# Patient Record
Sex: MALE | Race: WHITE | NOT HISPANIC OR LATINO | URBAN - METROPOLITAN AREA
[De-identification: names, ages, dates, MRNs, and addresses within clinical notes are randomized per-mention and may not be internally consistent; named-entity substitution may affect disease eponyms.]

---

## 2017-02-15 ENCOUNTER — COMMUNICATION - HEALTHEAST (OUTPATIENT)
Dept: INTERNAL MEDICINE | Facility: CLINIC | Age: 67
End: 2017-02-15

## 2017-02-15 DIAGNOSIS — E03.9 HYPOTHYROIDISM: ICD-10-CM

## 2017-03-02 ENCOUNTER — COMMUNICATION - HEALTHEAST (OUTPATIENT)
Dept: INTERNAL MEDICINE | Facility: CLINIC | Age: 67
End: 2017-03-02

## 2017-03-02 DIAGNOSIS — E78.5 HYPERLIPIDEMIA: ICD-10-CM

## 2017-08-19 ENCOUNTER — COMMUNICATION - HEALTHEAST (OUTPATIENT)
Dept: INTERNAL MEDICINE | Facility: CLINIC | Age: 67
End: 2017-08-19

## 2017-08-19 DIAGNOSIS — E03.9 HYPOTHYROIDISM: ICD-10-CM

## 2017-11-08 ENCOUNTER — COMMUNICATION - HEALTHEAST (OUTPATIENT)
Dept: INTERNAL MEDICINE | Facility: CLINIC | Age: 67
End: 2017-11-08

## 2017-11-08 DIAGNOSIS — E78.5 HYPERLIPIDEMIA: ICD-10-CM

## 2017-11-24 ENCOUNTER — COMMUNICATION - HEALTHEAST (OUTPATIENT)
Dept: INTERNAL MEDICINE | Facility: CLINIC | Age: 67
End: 2017-11-24

## 2017-11-24 DIAGNOSIS — E03.9 HYPOTHYROIDISM: ICD-10-CM

## 2018-02-26 ENCOUNTER — COMMUNICATION - HEALTHEAST (OUTPATIENT)
Dept: INTERNAL MEDICINE | Facility: CLINIC | Age: 68
End: 2018-02-26

## 2018-02-26 DIAGNOSIS — E03.9 HYPOTHYROIDISM: ICD-10-CM

## 2018-05-22 ENCOUNTER — COMMUNICATION - HEALTHEAST (OUTPATIENT)
Dept: INTERNAL MEDICINE | Facility: CLINIC | Age: 68
End: 2018-05-22

## 2018-05-22 DIAGNOSIS — E03.9 HYPOTHYROIDISM: ICD-10-CM

## 2018-08-24 ENCOUNTER — COMMUNICATION - HEALTHEAST (OUTPATIENT)
Dept: INTERNAL MEDICINE | Facility: CLINIC | Age: 68
End: 2018-08-24

## 2018-08-24 DIAGNOSIS — E03.9 HYPOTHYROIDISM: ICD-10-CM

## 2018-11-09 ENCOUNTER — COMMUNICATION - HEALTHEAST (OUTPATIENT)
Dept: INTERNAL MEDICINE | Facility: CLINIC | Age: 68
End: 2018-11-09

## 2018-11-09 DIAGNOSIS — E78.5 HYPERLIPIDEMIA: ICD-10-CM

## 2018-12-02 ENCOUNTER — COMMUNICATION - HEALTHEAST (OUTPATIENT)
Dept: INTERNAL MEDICINE | Facility: CLINIC | Age: 68
End: 2018-12-02

## 2018-12-02 DIAGNOSIS — E03.9 HYPOTHYROIDISM: ICD-10-CM

## 2019-06-11 ENCOUNTER — RECORDS - HEALTHEAST (OUTPATIENT)
Dept: ADMINISTRATIVE | Facility: OTHER | Age: 69
End: 2019-06-11

## 2019-08-22 ENCOUNTER — COMMUNICATION - HEALTHEAST (OUTPATIENT)
Dept: INTERNAL MEDICINE | Facility: CLINIC | Age: 69
End: 2019-08-22

## 2019-08-22 DIAGNOSIS — E78.5 HYPERLIPIDEMIA: ICD-10-CM

## 2019-12-17 ENCOUNTER — COMMUNICATION - HEALTHEAST (OUTPATIENT)
Dept: INTERNAL MEDICINE | Facility: CLINIC | Age: 69
End: 2019-12-17

## 2019-12-17 DIAGNOSIS — E03.9 HYPOTHYROIDISM: ICD-10-CM

## 2020-03-30 ENCOUNTER — OFFICE VISIT - HEALTHEAST (OUTPATIENT)
Dept: INTERNAL MEDICINE | Facility: CLINIC | Age: 70
End: 2020-03-30

## 2020-03-30 ENCOUNTER — COMMUNICATION - HEALTHEAST (OUTPATIENT)
Dept: INTERNAL MEDICINE | Facility: CLINIC | Age: 70
End: 2020-03-30

## 2020-03-30 DIAGNOSIS — E78.5 HYPERLIPIDEMIA: ICD-10-CM

## 2020-03-30 DIAGNOSIS — E03.9 ACQUIRED HYPOTHYROIDISM: ICD-10-CM

## 2020-03-30 DIAGNOSIS — Z12.5 PROSTATE CANCER SCREENING: ICD-10-CM

## 2020-06-30 ENCOUNTER — COMMUNICATION - HEALTHEAST (OUTPATIENT)
Dept: INTERNAL MEDICINE | Facility: CLINIC | Age: 70
End: 2020-06-30

## 2020-06-30 DIAGNOSIS — E03.9 HYPOTHYROIDISM: ICD-10-CM

## 2020-07-07 ENCOUNTER — COMMUNICATION - HEALTHEAST (OUTPATIENT)
Dept: INTERNAL MEDICINE | Facility: CLINIC | Age: 70
End: 2020-07-07

## 2020-07-07 DIAGNOSIS — E03.9 HYPOTHYROIDISM: ICD-10-CM

## 2020-07-08 ENCOUNTER — COMMUNICATION - HEALTHEAST (OUTPATIENT)
Dept: INTERNAL MEDICINE | Facility: CLINIC | Age: 70
End: 2020-07-08

## 2020-07-08 DIAGNOSIS — E03.9 HYPOTHYROIDISM: ICD-10-CM

## 2020-12-27 ENCOUNTER — COMMUNICATION - HEALTHEAST (OUTPATIENT)
Dept: INTERNAL MEDICINE | Facility: CLINIC | Age: 70
End: 2020-12-27

## 2020-12-27 DIAGNOSIS — E78.5 HYPERLIPIDEMIA: ICD-10-CM

## 2021-01-03 ENCOUNTER — COMMUNICATION - HEALTHEAST (OUTPATIENT)
Dept: INTERNAL MEDICINE | Facility: CLINIC | Age: 71
End: 2021-01-03

## 2021-01-03 DIAGNOSIS — E03.9 HYPOTHYROIDISM: ICD-10-CM

## 2021-01-03 DIAGNOSIS — E78.5 HYPERLIPIDEMIA: ICD-10-CM

## 2021-01-13 ENCOUNTER — OFFICE VISIT - HEALTHEAST (OUTPATIENT)
Dept: INTERNAL MEDICINE | Facility: CLINIC | Age: 71
End: 2021-01-13

## 2021-01-13 ENCOUNTER — AMBULATORY - HEALTHEAST (OUTPATIENT)
Dept: LAB | Facility: CLINIC | Age: 71
End: 2021-01-13

## 2021-01-13 DIAGNOSIS — D35.2 BENIGN NEOPLASM OF PITUITARY GLAND AND CRANIOPHARYNGEAL DUCT (POUCH) (H): ICD-10-CM

## 2021-01-13 DIAGNOSIS — E03.9 ACQUIRED HYPOTHYROIDISM: ICD-10-CM

## 2021-01-13 DIAGNOSIS — Z12.5 PROSTATE CANCER SCREENING: ICD-10-CM

## 2021-01-13 DIAGNOSIS — E78.5 HYPERLIPIDEMIA: ICD-10-CM

## 2021-01-13 DIAGNOSIS — D35.3 BENIGN NEOPLASM OF PITUITARY GLAND AND CRANIOPHARYNGEAL DUCT (POUCH) (H): ICD-10-CM

## 2021-01-13 LAB
ALBUMIN SERPL-MCNC: 3.6 G/DL (ref 3.5–5)
ALP SERPL-CCNC: 100 U/L (ref 45–120)
ALT SERPL W P-5'-P-CCNC: 31 U/L (ref 0–45)
ANION GAP SERPL CALCULATED.3IONS-SCNC: 10 MMOL/L (ref 5–18)
AST SERPL W P-5'-P-CCNC: 24 U/L (ref 0–40)
BILIRUB DIRECT SERPL-MCNC: 0.3 MG/DL
BILIRUB SERPL-MCNC: 0.8 MG/DL (ref 0–1)
BUN SERPL-MCNC: 17 MG/DL (ref 8–28)
CALCIUM SERPL-MCNC: 8.9 MG/DL (ref 8.5–10.5)
CHLORIDE BLD-SCNC: 106 MMOL/L (ref 98–107)
CHOLEST SERPL-MCNC: 167 MG/DL
CO2 SERPL-SCNC: 24 MMOL/L (ref 22–31)
CORTIS SERPL-MCNC: 8.9 UG/DL
CREAT SERPL-MCNC: 0.95 MG/DL (ref 0.7–1.3)
FASTING STATUS PATIENT QL REPORTED: YES
GFR SERPL CREATININE-BSD FRML MDRD: >60 ML/MIN/1.73M2
GLUCOSE BLD-MCNC: 87 MG/DL (ref 70–125)
HDLC SERPL-MCNC: 37 MG/DL
LDLC SERPL CALC-MCNC: 118 MG/DL
POTASSIUM BLD-SCNC: 4.1 MMOL/L (ref 3.5–5)
PROT SERPL-MCNC: 6.5 G/DL (ref 6–8)
PSA SERPL-MCNC: 6.2 NG/ML (ref 0–6.5)
SODIUM SERPL-SCNC: 140 MMOL/L (ref 136–145)
T4 FREE SERPL-MCNC: 0.8 NG/DL (ref 0.7–1.8)
TRIGL SERPL-MCNC: 60 MG/DL
TSH SERPL DL<=0.005 MIU/L-ACNC: 2.15 UIU/ML (ref 0.3–5)

## 2021-01-13 RX ORDER — LORAZEPAM 0.5 MG/1
TABLET ORAL
Qty: 1 TABLET | Refills: 0 | Status: SHIPPED | OUTPATIENT
Start: 2021-01-13 | End: 2021-08-06

## 2021-01-15 ENCOUNTER — COMMUNICATION - HEALTHEAST (OUTPATIENT)
Dept: INTERNAL MEDICINE | Facility: CLINIC | Age: 71
End: 2021-01-15

## 2021-01-15 LAB
SHBG SERPL-SCNC: 22 NMOL/L (ref 11–80)
TESTOST FREE SERPL-MCNC: 3.92 NG/DL (ref 4.7–24.4)
TESTOST SERPL-MCNC: 168 NG/DL (ref 240–950)

## 2021-02-02 ENCOUNTER — COMMUNICATION - HEALTHEAST (OUTPATIENT)
Dept: INTERNAL MEDICINE | Facility: CLINIC | Age: 71
End: 2021-02-02

## 2021-02-10 ENCOUNTER — HOSPITAL ENCOUNTER (OUTPATIENT)
Dept: MRI IMAGING | Facility: HOSPITAL | Age: 71
Discharge: HOME | End: 2021-02-10
Attending: INTERNAL MEDICINE

## 2021-02-10 DIAGNOSIS — D35.2 BENIGN NEOPLASM OF PITUITARY GLAND AND CRANIOPHARYNGEAL DUCT (POUCH) (H): ICD-10-CM

## 2021-02-10 DIAGNOSIS — D35.3 BENIGN NEOPLASM OF PITUITARY GLAND AND CRANIOPHARYNGEAL DUCT (POUCH) (H): ICD-10-CM

## 2021-02-23 ENCOUNTER — COMMUNICATION - HEALTHEAST (OUTPATIENT)
Dept: FAMILY MEDICINE | Facility: CLINIC | Age: 71
End: 2021-02-23

## 2021-03-31 ENCOUNTER — COMMUNICATION - HEALTHEAST (OUTPATIENT)
Dept: INTERNAL MEDICINE | Facility: CLINIC | Age: 71
End: 2021-03-31

## 2021-03-31 DIAGNOSIS — E03.9 HYPOTHYROIDISM: ICD-10-CM

## 2021-03-31 DIAGNOSIS — E78.5 HYPERLIPIDEMIA: ICD-10-CM

## 2021-04-01 RX ORDER — SIMVASTATIN 20 MG
20 TABLET ORAL AT BEDTIME
Qty: 90 TABLET | Refills: 3 | Status: SHIPPED | OUTPATIENT
Start: 2021-04-01 | End: 2022-09-12

## 2021-04-01 RX ORDER — LEVOTHYROXINE SODIUM 50 UG/1
50 TABLET ORAL DAILY
Qty: 90 TABLET | Refills: 3 | Status: SHIPPED | OUTPATIENT
Start: 2021-04-01 | End: 2022-06-21

## 2021-05-24 ENCOUNTER — OFFICE VISIT - HEALTHEAST (OUTPATIENT)
Dept: INTERNAL MEDICINE | Facility: CLINIC | Age: 71
End: 2021-05-24

## 2021-05-24 DIAGNOSIS — H61.23 CERUMINOSIS, BILATERAL: ICD-10-CM

## 2021-05-27 ENCOUNTER — RECORDS - HEALTHEAST (OUTPATIENT)
Dept: ADMINISTRATIVE | Facility: CLINIC | Age: 71
End: 2021-05-27

## 2021-05-31 NOTE — TELEPHONE ENCOUNTER
RN cannot approve Refill Request    RN can NOT refill this medication PCP messaged that patient is overdue for Office Visit. Last office visit: 4/6/2016 Caleb Hallman MD Last Physical: Visit date not found Last MTM visit: Visit date not found Last visit same specialty: 4/6/2016 Caleb Hallman MD.  Next visit within 3 mo: Visit date not found  Next physical within 3 mo: Visit date not found      Jocelyn LANG Kevin, [facility] Triage/Med Refill 8/22/2019    Requested Prescriptions   Pending Prescriptions Disp Refills     simvastatin (ZOCOR) 20 MG tablet [Pharmacy Med Name: Simvastatin Oral Tablet 20 MG] 90 tablet 1     Sig: TAKE 1 TABLET BY MOUTH AT BEDTIME       Statins Refill Protocol (Hmg CoA Reductase Inhibitors) Failed - 8/22/2019  7:01 AM        Failed - PCP or prescribing provider visit in past 12 months      Last office visit with prescriber/PCP: 4/6/2016 Caleb Hallman MD OR same dept: Visit date not found OR same specialty: 4/6/2016 Caleb Hallman MD  Last physical: Visit date not found Last MTM visit: Visit date not found   Next visit within 3 mo: Visit date not found  Next physical within 3 mo: Visit date not found  Prescriber OR PCP: Caleb Hallman MD  Last diagnosis associated with med order: 1. Hyperlipidemia  - simvastatin (ZOCOR) 20 MG tablet [Pharmacy Med Name: Simvastatin Oral Tablet 20 MG]; TAKE 1 TABLET BY MOUTH AT BEDTIME  Dispense: 90 tablet; Refill: 1    If protocol passes may refill for 12 months if within 3 months of last provider visit (or a total of 15 months).

## 2021-06-04 NOTE — TELEPHONE ENCOUNTER
Patient Returning Call  Reason for call:  Patient called back.  Information relayed to patient:n/a  Patient has additional questions:  Yes  If YES, what are your questions/concerns:  Calling on the status of this medication. Please address.  Okay to leave a detailed message?: Yes

## 2021-06-04 NOTE — TELEPHONE ENCOUNTER
RN cannot approve Refill Request    RN can NOT refill this medication PCP messaged that patient is overdue for Labs and Office Visit.     Last office visit: 4/6/2016 Caleb Hallman MD Last Physical: Visit date not found Last MTM visit: Visit date not found Last visit same specialty: 4/6/2016 Caleb Hallman MD.  Next visit within 3 mo: Visit date not found  Next physical within 3 mo: Visit date not found      Jose Walsh, [facility] Triage/Med Refill 12/18/2019    Requested Prescriptions   Pending Prescriptions Disp Refills     levothyroxine (SYNTHROID, LEVOTHROID) 50 MCG tablet [Pharmacy Med Name: Levothyroxine Sodium Oral Tablet 50 MCG] 90 tablet 2     Sig: TAKE ONE TABLET BY MOUTH ONE TIME DAILY       Thyroid Hormones Protocol Failed - 12/18/2019 10:52 AM        Failed - Provider visit in past 12 months or next 3 months     Last office visit with prescriber/PCP: 4/6/2016 Caleb Hallman MD OR same dept: Visit date not found OR same specialty: 4/6/2016 Caleb Hallman MD  Last physical: Visit date not found Last MTM visit: Visit date not found   Next visit within 3 mo: Visit date not found  Next physical within 3 mo: Visit date not found  Prescriber OR PCP: Caleb Hallman MD  Last diagnosis associated with med order: 1. Hypothyroidism  - levothyroxine (SYNTHROID, LEVOTHROID) 50 MCG tablet [Pharmacy Med Name: Levothyroxine Sodium Oral Tablet 50 MCG]; TAKE ONE TABLET BY MOUTH ONE TIME DAILY  Dispense: 90 tablet; Refill: 2    If protocol passes may refill for 12 months if within 3 months of last provider visit (or a total of 15 months).             Failed - TSH on file in past 12 months for patient age 12 & older     TSH   Date Value Ref Range Status   06/23/2015 1.55 0.30 - 5.05 uIU/mL Final

## 2021-06-05 VITALS
BODY MASS INDEX: 32.14 KG/M2 | RESPIRATION RATE: 16 BRPM | WEIGHT: 237 LBS | HEART RATE: 68 BPM | SYSTOLIC BLOOD PRESSURE: 134 MMHG | TEMPERATURE: 98.2 F | DIASTOLIC BLOOD PRESSURE: 86 MMHG

## 2021-06-07 NOTE — PROGRESS NOTES
"Blas Garza is a 69 y.o. male who is being evaluated via a billable telephone visit.      The patient has been notified of following:     \"This telephone visit will be conducted via a call between you and your physician/provider. We have found that certain health care needs can be provided without the need for a physical exam.  This service lets us provide the care you need with a short phone conversation.  If a prescription is necessary we can send it directly to your pharmacy.  If lab work is needed we can place an order for that and you can then stop by our lab to have the test done at a later time.    If during the course of the call the physician/provider feels a telephone visit is not appropriate, you will not be charged for this service.\"     Physician has received verbal consent for a Telephone Visit from the patient? Yes    Blas Garza complains of    Chief Complaint   Patient presents with     Establish Care     Medication Refill     Simvastatin     I have reviewed and updated the patient's Past Medical History, Social History, Family History and Medication List.    ALLERGIES  Patient has no known allergies.    Additional provider notes: Formally patient of Dr. Hallman, but had not seen him since April 2016.  Hx of large nonfunctioning prolactinoma/pituitary microadenoma (2012, s/p transsphenoidal resection. In 2017, underwent gamma knife for residual pituitary adenoma in the right sella) with subsequent hypopituitarism, hypothyroidism, hyperlipidemia.  Sees endocrine, at [place], last in September 2018, at which time was on 50 mcg daily with plan to undergo MRI head, a.m. cortisol, free T4 in 1 year.  Last labs in [facility] everywhere from 6/23/2015 with normal CMP, , TSH 1.55, PSA 4.9 HDL 38.  Did have elevated blood pressure on last visit in 2016, but prior to that no documented elevated or high blood pressure.    Endorses not being evaluated due to other responsibilities but has no " specific symptoms.  Intends to reestablish care with his providers both at [place] and in our clinic once issues with the coronavirus subside.  Continues to take aspirin 81 mg and levothyroxine 50 mcg daily.  Colonoscopy June 2019 with minimal diverticulosis; Repeat in 10 years.    Assessment/Plan:  1. Hyperlipidemia  Counseled to come to clinic fasting when seen later in 2012  - simvastatin (ZOCOR) 20 MG tablet; Take 1 tablet (20 mg total) by mouth at bedtime.  Dispense: 90 tablet; Refill: 2  - Lipid Cascade; Future  - Hepatic Profile; Future    2. Acquired hypothyroidism  - Thyroid Stimulating Hormone (TSH); Future    3. Prostate cancer screening  Overdue for screening  - PSA (Prostatic-Specific Antigen), Annual Screen; Future    Phone call duration: 12 minutes  2:10 PM-2:22 PM

## 2021-06-09 NOTE — TELEPHONE ENCOUNTER
Error.    Does not need.    Jessica BEYER LPN .......... 8:17 AM  07/07/20  [facility]

## 2021-06-09 NOTE — TELEPHONE ENCOUNTER
Patient does not need refill. He will call next week to get scheduled for a lab only visit.    Jessica BEYER LPN .......... 11:49 AM  07/02/20  [facility]

## 2021-06-09 NOTE — TELEPHONE ENCOUNTER
Please see THREE other encounters that state patient does not need this medication.    He is aware he needs labs.    Jessica BEYER LPN .......... 3:54 PM  07/10/20  [facility]

## 2021-06-09 NOTE — TELEPHONE ENCOUNTER
RN cannot approve Refill Request    RN can NOT refill this medication Protocol failed and NO refill given. Last office visit: Visit date not found Last Physical: Visit date not found Last MTM visit: Visit date not found Last visit same specialty: Visit date not found.  Next visit within 3 mo: Visit date not found  Next physical within 3 mo: Visit date not found      Edilia Ortiz, [facility] Triage/Med Refill 7/10/2020    Requested Prescriptions   Pending Prescriptions Disp Refills     levothyroxine (SYNTHROID, LEVOTHROID) 50 MCG tablet 90 tablet 2     Sig: Take 1 tablet (50 mcg total) by mouth daily.       Thyroid Hormones Protocol Failed - 7/8/2020  2:50 PM        Failed - TSH on file in past 12 months for patient age 12 & older     TSH   Date Value Ref Range Status   06/23/2015 1.55 0.30 - 5.05 uIU/mL Final                   Passed - Provider visit in past 12 months or next 3 months     Last office visit with prescriber/PCP: Visit date not found OR same dept: Visit date not found OR same specialty: Visit date not found  Last physical: Visit date not found Last MTM visit: Visit date not found   Next visit within 3 mo: Visit date not found  Next physical within 3 mo: Visit date not found  Prescriber OR PCP: Hilario Colbert MD  Last diagnosis associated with med order: 1. Hypothyroidism  - levothyroxine (SYNTHROID, LEVOTHROID) 50 MCG tablet; Take 1 tablet (50 mcg total) by mouth daily.  Dispense: 90 tablet; Refill: 2    If protocol passes may refill for 12 months if within 3 months of last provider visit (or a total of 15 months).

## 2021-06-14 NOTE — TELEPHONE ENCOUNTER
Refill Approved    Rx renewed per Medication Renewal Policy. Medication was last renewed on 3/30//20.    Edilia Ortiz, [facility] Triage/Med Refill 12/28/2020     Requested Prescriptions   Pending Prescriptions Disp Refills     simvastatin (ZOCOR) 20 MG tablet [Pharmacy Med Name: Simvastatin Oral Tablet 20 MG] 90 tablet 0     Sig: Take 1 tablet (20 mg total) by mouth at bedtime.       Statins Refill Protocol (Hmg CoA Reductase Inhibitors) Passed - 12/27/2020  2:00 AM        Passed - PCP or prescribing provider visit in past 12 months      Last office visit with prescriber/PCP: Visit date not found OR same dept: Visit date not found OR same specialty: Visit date not found  Last physical: Visit date not found Last MTM visit: Visit date not found   Next visit within 3 mo: Visit date not found  Next physical within 3 mo: Visit date not found  Prescriber OR PCP: Hilario Colbert MD  Last diagnosis associated with med order: 1. Hyperlipidemia  - simvastatin (ZOCOR) 20 MG tablet [Pharmacy Med Name: Simvastatin Oral Tablet 20 MG]; Take 1 tablet (20 mg total) by mouth at bedtime.  Dispense: 90 tablet; Refill: 0    If protocol passes may refill for 12 months if within 3 months of last provider visit (or a total of 15 months).

## 2021-06-14 NOTE — TELEPHONE ENCOUNTER
Patient called back in regards to message below.  Patient scheduled appt for Wed 1/13/21 at 9:00am with Dr Colbert.

## 2021-06-14 NOTE — PROGRESS NOTES
Assessment & Plan   Prolactinoma Macroadenoma  Acquired hypothyroidism  Status post pituitary surgery via gamma knife with residual adenoma in the right sella.  Reviewed [facility] endocrine notes including most recent note and MRI head with and without contrast from 9/10/2018.  Will be checking the requested labs today. Ordered MR and labs today will be forwarded to [facility] prior to meeting up with his endocrinologist Dr. Pineda.  Fax number   - MR Brain With Without Contrast  - Basic Metabolic Panel  - Testosterone, Free and Total  - Cortisol  - T4, Free  - LORazepam (ATIVAN) 0.5 MG tablet  Dispense: 1 tablet; Refill: 0  - T4, Free    28 minutes spent on the date of the encounter doing chart review, history and exam, documentation and further activities as noted above    Return in about 6 weeks (around 2/24/2021), or Physical/AWV.    Hilario Colbert MD  [facility]     Blas Garza is 70 y.o. and presents to clinic today for the following health issues   HPI Mr. Garza brought paperwork/letter from [facility] requesting for labs to be checked locally: cortisol, free T4, total and bioavailable testosterone. Then will see Dr Pineda, his endocrinologist at [facility].  They want the results faxed to .  Denies any changes in symptoms including headache, visual disturbance/changes.  Also overdue for MRI of the head.  He is requesting an anxiolytic prior to the MRI.  Also notes that he should schedule a physical.  History also pertinent for hyperlipidemia managed with simvastatin.  He is overdue for labs that were ordered back in March 2020.  Has no specific complaints including no fever, sweats, chills, angina/chest pain, shortness of breath, cough, nausea, vomiting or constipation/diarrhea.     Review of Systems  ROS A comprehensive review of systems was performed and was otherwise negative        Objective    /86 (Patient Site: Right Arm, Patient  Position: Sitting, Cuff Size: Adult Regular)   Pulse 68   Temp 98.2  F (36.8  C) (Oral)   Resp 16   Wt (!) 237 lb (107.5 kg)   BMI 32.14 kg/m    Body mass index is 32.14 kg/m .  Physical Exam  GENERAL APPEARANCE: Pleasant, nontoxic-appearing, no acute distress   HEENT: Head normal. Hearing was normal to voice.    RESPIRATORY: Bilaterally with no crackles, wheezing or rhonchi  CARDIOVASCULAR: Regular S1 and S2. Radial pulses intact.  No edema.  GASTROINTESTINAL: NABS, soft, NT, ND, no HSM  MUSCULOSKELETAL: Skeletal configuration was normal  SKIN/HAIR/NAILS: Skin color was normal.    NEUROLOGIC: Alert and oriented to person, place, time, and circumstance. Speech was normal.     I spent a total of 28 minutes face-to-face with Blas Garza during today's office visit.  Over 50% of this time was spent counseling the patient and/or coordinating care regarding management of his endocrine issues.  See note for details.

## 2021-06-15 NOTE — TELEPHONE ENCOUNTER
Results printed and faxed to Dr. Renetta Pineda as requested    Copy of results mailed to patient home address      ----- Message from Hilario Colbert MD sent at 2/11/2021  7:24 AM CST -----  Please print out 2 copies of this MRI brain report.  Please fax 1 copy to patient's endocrinologist: Dr. Renetta Pineda.  Fax number   Please mail second copy to patient's home address for his old medical records  Thank you      Fallon Ahuja, CMA

## 2021-06-16 PROBLEM — E78.5 HLD (HYPERLIPIDEMIA): Status: ACTIVE | Noted: 2021-05-24

## 2021-06-16 PROBLEM — Z78.9 NONSMOKER: Chronic | Status: ACTIVE | Noted: 2021-05-24

## 2021-06-16 NOTE — TELEPHONE ENCOUNTER
Refill Approved    Rx renewed per Medication Renewal Policy. Medication was last renewed on 1/4/21.    Dain Bellamy, [facility] Triage/Med Refill 4/1/2021     Requested Prescriptions   Pending Prescriptions Disp Refills     simvastatin (ZOCOR) 20 MG tablet [Pharmacy Med Name: Simvastatin Oral Tablet 20 MG] 90 tablet 0     Sig: Take 1 tablet (20 mg total) by mouth at bedtime.       Statins Refill Protocol (Hmg CoA Reductase Inhibitors) Passed - 3/31/2021  2:01 AM        Passed - PCP or prescribing provider visit in past 12 months      Last office visit with prescriber/PCP: 1/13/2021 Hilario Colbert MD OR same dept: Visit date not found OR same specialty: 1/13/2021 Hilario Colbert MD  Last physical: Visit date not found Last MTM visit: Visit date not found   Next visit within 3 mo: Visit date not found  Next physical within 3 mo: Visit date not found  Prescriber OR PCP: Hilario Colbert MD  Last diagnosis associated with med order: 1. Hyperlipidemia  - simvastatin (ZOCOR) 20 MG tablet [Pharmacy Med Name: Simvastatin Oral Tablet 20 MG]; Take 1 tablet (20 mg total) by mouth at bedtime.  Dispense: 90 tablet; Refill: 0    2. Hypothyroidism  - levothyroxine (SYNTHROID, LEVOTHROID) 50 MCG tablet [Pharmacy Med Name: Levothyroxine Sodium Oral Tablet 50 MCG]; Take 1 tablet (50 mcg total) by mouth daily.  Dispense: 90 tablet; Refill: 0    If protocol passes may refill for 12 months if within 3 months of last provider visit (or a total of 15 months).                levothyroxine (SYNTHROID, LEVOTHROID) 50 MCG tablet [Pharmacy Med Name: Levothyroxine Sodium Oral Tablet 50 MCG] 90 tablet 0     Sig: Take 1 tablet (50 mcg total) by mouth daily.       Thyroid Hormones Protocol Passed - 3/31/2021  2:01 AM        Passed - Provider visit in past 12 months or next 3 months     Last office visit with prescriber/PCP: 1/13/2021 Hilario Colbert MD OR same dept: Visit date not found OR same  specialty: 1/13/2021 Hilario Colbert MD  Last physical: Visit date not found Last MTM visit: Visit date not found   Next visit within 3 mo: Visit date not found  Next physical within 3 mo: Visit date not found  Prescriber OR PCP: Hilario Colbert MD  Last diagnosis associated with med order: 1. Hyperlipidemia  - simvastatin (ZOCOR) 20 MG tablet [Pharmacy Med Name: Simvastatin Oral Tablet 20 MG]; Take 1 tablet (20 mg total) by mouth at bedtime.  Dispense: 90 tablet; Refill: 0    2. Hypothyroidism  - levothyroxine (SYNTHROID, LEVOTHROID) 50 MCG tablet [Pharmacy Med Name: Levothyroxine Sodium Oral Tablet 50 MCG]; Take 1 tablet (50 mcg total) by mouth daily.  Dispense: 90 tablet; Refill: 0    If protocol passes may refill for 12 months if within 3 months of last provider visit (or a total of 15 months).             Passed - TSH on file in past 12 months for patient age 12 & older     TSH   Date Value Ref Range Status   01/13/2021 2.15 0.30 - 5.00 uIU/mL Final

## 2021-06-17 NOTE — PATIENT INSTRUCTIONS - HE
Please follow up if you have any further issues.    You may contact me by phone or MyChart if you are worsening or if things are not improving.    ______________________________________________________________________     Please remember that you can call [phone] to schedule an appointment.     You can schedule appointments 24 hours a day, 7 days a week.  Sometimes the best time to schedule an appointment is after clinic hours when less people are calling in.  Weekends are another option for calling in to schedule appointments.      Future Appointments   Date Time Provider Department [place]   6/28/2021  1:40 PM Asael Ricks MD [facility]

## 2021-06-21 NOTE — LETTER
Letter by Hilario Colbert MD at      Author: Hilario Colbert MD Service: -- Author Type: --    Filed:  Encounter Date: 2/2/2021 Status: (Other)         Blas Garza  [address] [place] [address]   February 2, 2021      Dear Mr. Garza,    Below are the results from your recent visit:    Resulted Orders   PSA (Prostatic-Specific Antigen), Annual Screen   Result Value Ref Range    PSA 6.2 0.0 - 6.5 ng/mL    Narrative    Method is Abbott Prostate-Specific Antigen (PSA)  Standard-WHO 1st International (90:10)   Lipid Cascade   Result Value Ref Range    Cholesterol 167 <=199 mg/dL    Triglycerides 60 <=149 mg/dL    HDL Cholesterol 37 (L) >=40 mg/dL    LDL Calculated 118 <=129 mg/dL    Patient Fasting > 8hrs? Yes    Hepatic Profile   Result Value Ref Range    Bilirubin, Total 0.8 0.0 - 1.0 mg/dL    Bilirubin, Direct 0.3 <=0.5 mg/dL    Protein, Total 6.5 6.0 - 8.0 g/dL    Albumin 3.6 3.5 - 5.0 g/dL    Alkaline Phosphatase 100 45 - 120 U/L    AST 24 0 - 40 U/L    ALT 31 0 - 45 U/L   Thyroid Stimulating Hormone (TSH)   Result Value Ref Range    TSH 2.15 0.30 - 5.00 uIU/mL   Basic Metabolic Panel   Result Value Ref Range    Sodium 140 136 - 145 mmol/L    Potassium 4.1 3.5 - 5.0 mmol/L    Chloride 106 98 - 107 mmol/L    CO2 24 22 - 31 mmol/L    Anion Gap, Calculation 10 5 - 18 mmol/L    Glucose 87 70 - 125 mg/dL    Calcium 8.9 8.5 - 10.5 mg/dL    BUN 17 8 - 28 mg/dL    Creatinine 0.95 0.70 - 1.30 mg/dL    GFR MDRD Af Amer >60 >60 mL/min/1.73m2    GFR MDRD Non Af Amer >60 >60 mL/min/1.73m2    Narrative    Fasting Glucose reference range is 70-99 mg/dL per  American Diabetes Association (ADA) guidelines.     And below are the labs that we forwarded and sent to Dr. Pineda    Resulted Orders   Testosterone, Free and Total   Result Value Ref Range     Testosterone, Total 168 (L) 240 - 950 ng/dL         Comment:         This test was developed and its performance characteristics determined by  the  [facility].  It has not been cleared or approved by the FDA. The laboratory is regulated  under CLIA as qualified to perform high-complexity testing. This test is used  for clinical purposes. It should not be regarded as investigational or for  research.     Sex Hormone Bind Globulin 22 11 - 80 nmol/L     Free Testosterone Calc 3.92 (L) 4.7 - 24.4 ng/dL         Comment:            Performed and/or entered by:  [address] [id number]    Cortisol   Result Value Ref Range     Cortisol 8.9 ug/dL     Narrative     Reference Range:     a.m.: 7-25 ug/dL  p.m.: 2-13 ug/dL   T4, Free   Result Value Ref Range     Free T4 0.8 0.7 - 1.8 ng/dL     Your PSA should be rechecked in the next 6 to 12 months. Best wishes on your MRI from 2/10/2021.    Please call with questions or contact us using [employer].    Sincerely,    Electronically signed by Hilario Colbert MD

## 2021-06-21 NOTE — LETTER
Letter by Hilario Colbert MD at      Author: Hilario Colbert MD Service: -- Author Type: --    Filed:  Encounter Date: 1/15/2021 Status: (Other)         Blas Garza  [address]  [facility] [address]   January 15, 2021    Dear Dr Pineda,      Below are the results from Mr Garza recent visit:    Resulted Orders   Testosterone, Free and Total   Result Value Ref Range    Testosterone, Total 168 (L) 240 - 950 ng/dL      Comment:      This test was developed and its performance characteristics determined by the  [facility].  It has not been cleared or approved by the FDA. The laboratory is regulated  under CLIA as qualified to perform high-complexity testing. This test is used  for clinical purposes. It should not be regarded as investigational or for  research.    Sex Hormone Bind Globulin 22 11 - 80 nmol/L    Free Testosterone Calc 3.92 (L) 4.7 - 24.4 ng/dL      Comment:        Performed and/or entered by:  [address] [address]    Cortisol   Result Value Ref Range    Cortisol 8.9 ug/dL    Narrative    Reference Range:    a.m.: 7-25 ug/dL  p.m.: 2-13 ug/dL   T4, Free   Result Value Ref Range    Free T4 0.8 0.7 - 1.8 ng/dL     Sincerely,    Electronically signed by Hilario Colbert MD

## 2021-06-30 NOTE — PROGRESS NOTES
Progress Notes by Asael Ricks MD at 2021  9:30 AM     Author: Asael Ricks MD Service: -- Author Type: Physician    Filed: 2021  4:14 PM Encounter Date: 2021 Status: Signed    : Asael Ricks MD (Physician)                [facility]    Comprehensive and complex medical care - Chronic disease management - Shared decision making - Care coordination - Compassionate care    Patient advocacy - Rational deprescribing - Minimally disruptive medicine - Ethical focus - Customized care          Date of Service: 2021  Primary Provider: Asael Ricks MD    Patient Care Team:  Asael Ricks MD as PCP - General (Internal Medicine)  Hilario Colbert MD as Assigned PCP  [facility]     ______________________________________________________________________     Patient's Pharmacy:    [facility] [address] [[address] [address]  Phone: [phone] Fax: [phone]     Patient's Contacts:  Name Home Phone Work Phone Mobile Phone Relationship Lgl Jarod CONRAD,[facility] [phone]   Spouse      Patient's Insurance:    Payor/Plan Subscr  Sex Relation Sub. Ins. ID Effective Group Num   1. MEDICA - MEDI* BLAS CONRAD* 1950 Male  [id number] Not Eff [id number]                                   [address] [address]   2. MEDICA - MEDI* BLAS CONRAD* 1950 Male  [id number] Not Eff [id number]                                   [address] [address]            Blas KERMIT Conrad is a 70 y.o. male who comes in today for:    Chief Complaint   Patient presents with   ? Ear Fullness     right ear feels full and now ear is starting to hurt       Current Outpatient Medications   Medication Sig   ? aspirin 81 MG EC tablet Take 81 mg by mouth daily.   ? levothyroxine (SYNTHROID, LEVOTHROID) 50 MCG tablet Take 1 tablet (50 mcg total) by mouth daily.   ? LORazepam (ATIVAN) 0.5 MG tablet Take 15  min before MR head study   ? simvastatin (ZOCOR) 20 MG tablet Take 1 tablet (20 mg total) by mouth at bedtime.       Subjective:      Patient is new to me, but not new to internal medicine.    He has had problems with plugging in his ears, but mostly in his right.  He has lost hearing in his right ear.  Its been worse in the last 2 weeks.  It may have popped open at times.  He has no history of allergies.  His hearing has decreased significantly.  He denies any congestion.  He might have a little tenderness at the mastoid process behind the right ear.    Objective:     Wt Readings from Last 3 Encounters:   05/24/21 (!) 234 lb 3.2 oz (106.2 kg)   01/13/21 (!) 237 lb (107.5 kg)   04/06/16 (!) 226 lb (102.5 kg)     BP Readings from Last 3 Encounters:   05/24/21 132/86   01/13/21 134/86   04/06/16 138/88      /86   Pulse 67   Temp 97.7  F (36.5  C) (Oral)   Wt (!) 234 lb 3.2 oz (106.2 kg)   SpO2 95%   BMI 31.76 kg/m     In general, the patient is comfortable, no apparent distress.  Mood good.  Insight good.  Heart regular rate and rhythm.  Lungs clear to auscultation bilaterally.  His ears show bilateral cerumen gnosis with impaction.  There is no signs of external otitis or significant effusion in the ears after we removed the wax.    Diagnostics:     Results for orders placed or performed in visit on 01/13/21   PSA (Prostatic-Specific Antigen), Annual Screen   Result Value Ref Range    PSA 6.2 0.0 - 6.5 ng/mL   Lipid Cascade   Result Value Ref Range    Cholesterol 167 <=199 mg/dL    Triglycerides 60 <=149 mg/dL    HDL Cholesterol 37 (L) >=40 mg/dL    LDL Calculated 118 <=129 mg/dL    Patient Fasting > 8hrs? Yes    Hepatic Profile   Result Value Ref Range    Bilirubin, Total 0.8 0.0 - 1.0 mg/dL    Bilirubin, Direct 0.3 <=0.5 mg/dL    Protein, Total 6.5 6.0 - 8.0 g/dL    Albumin 3.6 3.5 - 5.0 g/dL    Alkaline Phosphatase 100 45 - 120 U/L    AST 24 0 - 40 U/L    ALT 31 0 - 45 U/L   Thyroid Stimulating Hormone  (TSH)   Result Value Ref Range    TSH 2.15 0.30 - 5.00 uIU/mL   Basic Metabolic Panel   Result Value Ref Range    Sodium 140 136 - 145 mmol/L    Potassium 4.1 3.5 - 5.0 mmol/L    Chloride 106 98 - 107 mmol/L    CO2 24 22 - 31 mmol/L    Anion Gap, Calculation 10 5 - 18 mmol/L    Glucose 87 70 - 125 mg/dL    Calcium 8.9 8.5 - 10.5 mg/dL    BUN 17 8 - 28 mg/dL    Creatinine 0.95 0.70 - 1.30 mg/dL    GFR MDRD Af Amer >60 >60 mL/min/1.73m2    GFR MDRD Non Af Amer >60 >60 mL/min/1.73m2        Assessment:     1. Ceruminosis, bilateral        Quality review:     PHQ-2 Total Score: 0 (1/13/2021  9:09 AM)    No data recorded  ______________________________________________________________________     BMI Readings from Last 1 Encounters:   05/24/21 31.76 kg/m        Plan:     1. My assistant first attempted to flush the ears which helped but did not remove the wax.  2. Using direct visualization and an alligator forceps, I was able to get the wax personally and remove it completely.  3. Patient instructed on Debrox.  4. He was to be scheduled for an annual wellness visit with me.    Asael Ricks MD  General Internal Medicine  [facility]    Return in about 5 weeks (around 6/28/2021) for annual wellness visit.     Future Appointments   Date Time Provider Department Center   6/28/2021  1:40 PM Asael Ricks MD [facility]         ______________________________________________________________________     Relevant ICD-10 codes and order associations:      ICD-10-CM    1. Ceruminosis, bilateral  H61.23

## 2021-07-06 VITALS
SYSTOLIC BLOOD PRESSURE: 132 MMHG | HEART RATE: 67 BPM | BODY MASS INDEX: 31.76 KG/M2 | OXYGEN SATURATION: 95 % | WEIGHT: 234.2 LBS | TEMPERATURE: 97.7 F | DIASTOLIC BLOOD PRESSURE: 86 MMHG

## 2021-08-06 ENCOUNTER — OFFICE VISIT (OUTPATIENT)
Dept: INTERNAL MEDICINE | Facility: CLINIC | Age: 71
End: 2021-08-06
Payer: COMMERCIAL

## 2021-08-06 VITALS
HEART RATE: 65 BPM | DIASTOLIC BLOOD PRESSURE: 86 MMHG | BODY MASS INDEX: 31.23 KG/M2 | WEIGHT: 230.6 LBS | SYSTOLIC BLOOD PRESSURE: 134 MMHG | OXYGEN SATURATION: 97 % | HEIGHT: 72 IN

## 2021-08-06 DIAGNOSIS — E03.8 SECONDARY HYPOTHYROIDISM: ICD-10-CM

## 2021-08-06 DIAGNOSIS — D35.2 PITUITARY MACROADENOMA (H): ICD-10-CM

## 2021-08-06 DIAGNOSIS — Z82.49 FH: CAD (CORONARY ARTERY DISEASE): ICD-10-CM

## 2021-08-06 DIAGNOSIS — Z78.9 NONSMOKER: Chronic | ICD-10-CM

## 2021-08-06 DIAGNOSIS — M79.651 PAIN OF RIGHT THIGH: ICD-10-CM

## 2021-08-06 DIAGNOSIS — E78.2 MIXED HYPERLIPIDEMIA: ICD-10-CM

## 2021-08-06 DIAGNOSIS — Z00.00 MEDICARE ANNUAL WELLNESS VISIT, SUBSEQUENT: Primary | ICD-10-CM

## 2021-08-06 LAB
ATRIAL RATE - MUSE: 59 BPM
DIASTOLIC BLOOD PRESSURE - MUSE: NORMAL MMHG
INTERPRETATION ECG - MUSE: NORMAL
P AXIS - MUSE: 40 DEGREES
PR INTERVAL - MUSE: 162 MS
QRS DURATION - MUSE: 98 MS
QT - MUSE: 434 MS
QTC - MUSE: 429 MS
R AXIS - MUSE: -26 DEGREES
SYSTOLIC BLOOD PRESSURE - MUSE: NORMAL MMHG
T AXIS - MUSE: 6 DEGREES
VENTRICULAR RATE- MUSE: 59 BPM

## 2021-08-06 PROCEDURE — 93005 ELECTROCARDIOGRAM TRACING: CPT | Performed by: INTERNAL MEDICINE

## 2021-08-06 PROCEDURE — 93010 ELECTROCARDIOGRAM REPORT: CPT | Performed by: GENERAL ACUTE CARE HOSPITAL

## 2021-08-06 PROCEDURE — 99397 PER PM REEVAL EST PAT 65+ YR: CPT | Performed by: INTERNAL MEDICINE

## 2021-08-06 PROCEDURE — 99213 OFFICE O/P EST LOW 20 MIN: CPT | Mod: 25 | Performed by: INTERNAL MEDICINE

## 2021-08-06 ASSESSMENT — MIFFLIN-ST. JEOR: SCORE: 1838.99

## 2021-08-06 ASSESSMENT — ACTIVITIES OF DAILY LIVING (ADL): CURRENT_FUNCTION: NO ASSISTANCE NEEDED

## 2021-08-06 NOTE — PROGRESS NOTES
"Answers for HPI/ROS submitted by the patient on 8/6/2021  In general, how would you rate your overall physical health?: good  Frequency of exercise:: 2-3 days/week  Do you usually eat at least 4 servings of fruit and vegetables a day, include whole grains & fiber, and avoid regularly eating high fat or \"junk\" foods? : Yes  Taking medications regularly:: Yes  Medication side effects:: None  Activities of Daily Living: no assistance needed  Home safety: no safety concerns identified  Hearing Impairment:: no hearing concerns  In the past 6 months, have you been bothered by leaking of urine?: No  In general, how would you rate your overall mental or emotional health?: good  Additional concerns today:: No  Duration of exercise:: 15-30 minutes    HPI  Do you feel safe in your environment? Yes    Have you ever done Advance Care Planning? (For example, a Health Directive, POLST, or a discussion with a medical provider or your loved ones about your wishes): No, advance care planning information given to patient to review.  Patient plans to discuss their wishes with loved ones or provider.         Fall risk       Cognitive Screening   1) Repeat 3 items (Leader, Season, Table)    2) Clock draw: NORMAL  3) 3 item recall: Recalls 3 objects  Results: 3 items recalled: COGNITIVE IMPAIRMENT LESS LIKELY    Mini-CogTM Copyright MAINE Leal. Licensed by the author for use in [facility]; reprinted with permission (brown@.[facility]). All rights reserved.    "

## 2021-08-06 NOTE — PATIENT INSTRUCTIONS
"You are due or coming due for your next tetanus vaccine.  If you are on Medicare, please check to see if your supplemental insurance covers this vaccine, as Medicare traditionally does not pay for this.    Here is some more information related to tetanus and why we recommend this vaccine:    Patient education: Tetanus (The Basics)    Written by the doctors and editors at [facility]    What is tetanus? -- Tetanus is a serious infection that causes muscle stiffness and spasms. It is sometimes called \"lockjaw\" because muscle spasms can clench the jaw shut.    Tetanus is caused by bacteria (germs) that live in the soil. They can get into the body through a cut or scrape. They can also get into the body if a person uses a needle to inject illegal drugs. Most people in the [place] are protected from these bacteria because they have gotten vaccines against them.    What are the symptoms of tetanus? -- The symptoms include:    ?Stiff jaw or neck muscles, which make it hard to move your jaw or neck normally  ?Strange-looking smile that does not go away when you try to relax your mouth  ?Tight, painful muscles that do not let go when you try to relax them  ?Trouble breathing, swallowing, or both  ?Feeling irritable or restless  ?Sweating even when you are not exercising or hot  ?Heartbeat that is faster than usual, or irregular heartbeat  ?Fever  ?Painful muscle spasms    People who are very sick with tetanus can have muscle spasms that force the body into a \"bridge\" position. They might have:    ?Clenched fists  ?Back arched off the floor or bed  ?Legs stretched out  ?Arms moving back and forth  ?Trouble breathing - They might even stop breathing during a muscle spasm.    Should I see a doctor or nurse? -- See your doctor or nurse right away if:    ?You get a puncture wound, for example from a nail that goes through your skin.  ?You get a cut, scrape, or other injury you cannot clean completely.  ?You have an injury that " leaves something (like a nail or glass) inside your body.  ?An animal bites you.  ?You have diabetes, and get a sore on your foot, leg, or other place.  ?You have a stiff jaw or neck, other tight muscles you cannot relax, or painful muscle spasms.  ?You have trouble breathing or swallowing.    It is especially important to see a doctor or nurse if you get a puncture wound or animal bite and your last tetanus shot was 5 years ago or longer, or if you do not remember getting a tetanus shot.    Is there a test for tetanus? -- No. There is no simple test. But your doctor or nurse should be able to tell if you have it by learning about your symptoms and vaccine history, and by doing an exam. This infection is most likely in people who have had an injury and who have not had the tetanus vaccine at all or not had the right vaccine boosters.    Is tetanus dangerous? -- Yes. People with tetanus need to go to the hospital, and some people even die from it. The muscle spasms can stop your breathing.    How is tetanus treated? -- Doctors treat tetanus in the hospital, sometimes in the intensive care unit (ICU). Treatments include:    ?Cleaning cuts or scrapes to remove skin and tissue that could have tetanus bacteria on it  ?Giving medicines to fight the infection  ?Giving a tetanus vaccine booster  ?Giving medicine and other treatments to reduce muscle spasms, breathing problems, pain, and other symptoms  ?Using a ventilator (breathing machine) if you have trouble breathing on your own  ?Using a feeding tube if you cannot eat or drink on your own  ?Having physical therapy to help muscles recover    Can tetanus be prevented? -- Yes. To reduce your chances of getting tetanus, do these things:    ?Get a tetanus vaccine. This teaches your body how to fight tetanus. Most children growing up in the [place] get this vaccine as part of their routine childhood vaccines.  ?Get regular tetanus booster shots. Adults should get  tetanus booster shots every 10 years. For bad wounds, you will need to get a tetanus booster shot if you haven't had one in the last 5 years. If you have a bad wound and you haven't received all of your tetanus vaccines or you are not sure if you have, you will need a tetanus booster shot and another shot to fight any tetanus bacteria that got in the wound.  ?Wash cuts or scrapes with soap and water and use antibiotic ointment on them. See a doctor or nurse if you cannot get all the dirt out or cannot see all the way into the wound.  ?Do not inject illegal drugs, or at least use clean needles if you do inject drugs or anything else.    ______________________________________________________________________      The new shingles shot (Shingrix) is 2 separate shots  by 2-6 months.  It is recommended for people 50 years of age and older.    The cost out of pocket is around $450 for the 2 shots, so you might want to see if your insurance covers it or a portion of it prior to having it done.      It is estimated that any person's risk of shingles over their lifetime is around 10-20%.    The new shot is 90% effective, reducing your risk of shingles to about 1-2% over your lifetime.    Because the shot is strong, it is very common to have flu like symptoms for 2-3 days after the shot.  25-50% of patients will have fever, muscle aches or headache.    I believe that whether or not you have this vaccine is your own decision depending on your values and preferences.  The information above I give you to make an informed decision.      ______________________________________________________________________      Preventive Health Recommendations    See your health care provider every year (or as recommended) to:    Review health changes.     Discuss preventive care.      Review your medicines and supplements.    Consider colon cancer screening between the ages of 50 and 75 years old if necessary.      Cholesterol and  diabetes testing as necessary.    Prostate specific antigen (PSA) testing until the age of 70 if desired.  Please be aware there are risks associated with this test.      Shots:    COVID vaccination if you have not had it yet.    Get a flu shot each year.    Get a tetanus shot every 10 years if you are under the age of 80 years old.    Talk to your doctor about a one time pneumonia vaccine that is recommended at the age of 65 years old.    Talk to your pharmacist about the shingles vaccine.  This is often better covered in the pharmacy through your insurance than if you have it in the clinic.    Lifestyle    Exercise at least 150 minutes a week (30 minutes a day, 5 days a week) if you are able. This will help you control your weight and prevent disease.    Limit alcohol to one drink per day.    No smoking.     Wear sunscreen to prevent skin cancer.     See your dentist twice a year for an exam and cleaning.    See your eye doctor every 1 to 2 years to screen for conditions such as glaucoma, macular degeneration and cataracts.    Personalized Prevention Plan  You are due for the preventive services outlined below.  Your care team is available to assist you in scheduling these services.  If you have already completed any of these items, please share that information with your care team to update in your medical record.    Health Maintenance Due   Topic Date Due     ANNUAL REVIEW OF HM ORDERS  Never done     ADVANCE CARE PLANNING  Never done     ZOSTER IMMUNIZATION (1 of 2) Never done     Pneumococcal Vaccine: 65+ Years (2 of 2 - PPSV23) 06/23/2016     DTAP/TDAP/TD IMMUNIZATION (2 - Td or Tdap) 09/23/2019

## 2021-08-06 NOTE — LETTER
8/6/2021    Blas Garza   [address]  [place] [address]       Dear Blas,    It was good to see you in clinic.  I hope your questions were answered at the time of your visit.  The results of your tests from your visit were as follows:    Resulted Orders   EKG 12-lead, tracing only   Result Value Ref Range    Systolic Blood Pressure  mmHg    Diastolic Blood Pressure  mmHg    Ventricular Rate 59 BPM    Atrial Rate 59 BPM    OR Interval 162 ms    QRS Duration 98 ms     ms    QTc 429 ms    P Axis 40 degrees    R AXIS -26 degrees    T Axis 6 degrees    Interpretation ECG       Sinus bradycardia  Otherwise normal ECG  No previous ECGs available  Confirmed by RASHEED SILVA MD LOC:JN ([id number]) on 8/6/2021 2:23:15 PM       Your electrocardiogram (EKG) looked good and the cardiologist felt so as well.  There is no signs of previous heart issue on this.  This serves as a good baseline for the future.    Please follow up again in 1 year, sooner if issues.      Sincerely,       Asael Ricks MD  General Internal Medicine, [facility]
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
## 2021-10-01 ENCOUNTER — NURSE TRIAGE (OUTPATIENT)
Dept: NURSING | Facility: CLINIC | Age: 71
End: 2021-10-01

## 2021-10-01 NOTE — TELEPHONE ENCOUNTER
Informed pt of Dr. Ricks's message.  Pt states he will stop taking the calcium.  Pt thanked for the call.

## 2021-10-01 NOTE — TELEPHONE ENCOUNTER
He did not have a prolactin level done when he was in in January for blood work.  Please see what he would like to have done with this.    I do not recommend that he takes calcium.

## 2021-10-01 NOTE — TELEPHONE ENCOUNTER
RN triage   Call from pt   Pt states he is looking for prolactin lab result -- for life insurance   No prolactin found   Pt also wants to know if he should be taking calcium and how much?  Pt states he forgot to ask PCP at last visit     Please advise     Bozena Burrows RN  [facility]  Triage Nurse Advisor    COVID 19 Nurse Triage Plan/Patient Instructions    Please be aware that novel coronavirus (COVID-19) may be circulating in the community. If you develop symptoms such as fever, cough, or SOB or if you have concerns about the presence of another infection including coronavirus (COVID-19), please contact your health care provider or visit [email or web address].     Disposition/Instructions    Home care recommended. Follow home care protocol based instructions.    Thank you for taking steps to prevent the spread of this virus.  o Limit your contact with others.  o Wear a simple mask to cover your cough.  o Wash your hands well and often.    Resources    [facility]: About COVID-19: [email or web address]    CDC: What to Do If You're Sick: www.cdc.gov/coronavirus/2019-ncov/about/steps-when-sick.html    CDC: Ending Home Isolation: www.cdc.gov/coronavirus/2019-ncov/hcp/disposition-in-home-patients.html     CDC: Caring for Someone: www.cdc.gov/coronavirus/2019-ncov/if-you-are-sick/care-for-someone.html     [facility]: Interim Guidance for Hospital Discharge to Home: www.health.[facility].mn.us/diseases/coronavirus/hcp/hospdischarge.pdf    [facility] clinical trials (COVID-19 research studies): [email or web address]     Below are the COVID-19 hotlines at the [place] Department of Health ([facility]). Interpreters are available.   o For health questions: Call 165-048-8935 or 1-269.483.2905 (7 a.m. to 7 p.m.)  o For questions about schools and childcare: Call 280-797-5030 or 1-828.705.2637 (7 a.m. to 7 p.m.)                     Additional Information    Nursing judgment    Protocols used:  INFORMATION ONLY CALL - NO TRIAGE-A-OH

## 2021-11-29 ENCOUNTER — OFFICE VISIT (OUTPATIENT)
Dept: FAMILY MEDICINE | Facility: CLINIC | Age: 71
End: 2021-11-29
Payer: COMMERCIAL

## 2021-11-29 VITALS
HEART RATE: 79 BPM | SYSTOLIC BLOOD PRESSURE: 138 MMHG | DIASTOLIC BLOOD PRESSURE: 84 MMHG | RESPIRATION RATE: 18 BRPM | TEMPERATURE: 98.5 F | OXYGEN SATURATION: 94 %

## 2021-11-29 DIAGNOSIS — H10.023 PINK EYE DISEASE OF BOTH EYES: ICD-10-CM

## 2021-11-29 DIAGNOSIS — R07.0 THROAT PAIN: ICD-10-CM

## 2021-11-29 DIAGNOSIS — J20.9 ACUTE BRONCHITIS, UNSPECIFIED ORGANISM: Primary | ICD-10-CM

## 2021-11-29 LAB
DEPRECATED S PYO AG THROAT QL EIA: NEGATIVE
GROUP A STREP BY PCR: NOT DETECTED

## 2021-11-29 PROCEDURE — 87651 STREP A DNA AMP PROBE: CPT | Performed by: PHYSICIAN ASSISTANT

## 2021-11-29 PROCEDURE — 99213 OFFICE O/P EST LOW 20 MIN: CPT | Performed by: PHYSICIAN ASSISTANT

## 2021-11-29 RX ORDER — POLYMYXIN B SULFATE AND TRIMETHOPRIM 1; 10000 MG/ML; [USP'U]/ML
2 SOLUTION OPHTHALMIC EVERY 4 HOURS
Qty: 5 ML | Refills: 0 | Status: SHIPPED | OUTPATIENT
Start: 2021-11-29 | End: 2021-12-06

## 2021-11-29 RX ORDER — AZITHROMYCIN 250 MG/1
TABLET, FILM COATED ORAL
Qty: 6 TABLET | Refills: 0 | Status: SHIPPED | OUTPATIENT
Start: 2021-11-29 | End: 2021-12-04

## 2021-11-29 ASSESSMENT — ENCOUNTER SYMPTOMS
GASTROINTESTINAL NEGATIVE: 1
DIARRHEA: 0
RHINORRHEA: 0
CHILLS: 0
FEVER: 0
CHEST TIGHTNESS: 0
MYALGIAS: 0
DYSURIA: 0
WHEEZING: 0
MUSCULOSKELETAL NEGATIVE: 1
EYE REDNESS: 1
VOMITING: 0
PHOTOPHOBIA: 0
SHORTNESS OF BREATH: 0
SINUS PAIN: 0
CARDIOVASCULAR NEGATIVE: 1
HEADACHES: 0
SORE THROAT: 1
COUGH: 1
ALLERGIC/IMMUNOLOGIC NEGATIVE: 1
EYE ITCHING: 0
CONSTITUTIONAL NEGATIVE: 1
NEUROLOGICAL NEGATIVE: 1
EYE PAIN: 0
ABDOMINAL PAIN: 0
FREQUENCY: 0
NAUSEA: 0
EYE DISCHARGE: 1
PALPITATIONS: 0
HEMATURIA: 0
SINUS PRESSURE: 0

## 2021-11-29 NOTE — PATIENT INSTRUCTIONS

## 2021-11-29 NOTE — PROGRESS NOTES
Chief Complaint:     Chief Complaint   Patient presents with     Cough     congestion, unable to sleep, x 1wk, coughing up mucus, no SOB or chest pain, no fever, slight dizziness few days ago, no headaches, sore throat       Results for orders placed or performed in visit on 11/29/21   Streptococcus A Rapid Screen w/Reflex to PCR - Clinic Collect     Status: Normal    Specimen: Throat; Swab   Result Value Ref Range    Group A Strep antigen Negative Negative       Medical Decision Making:    Vital signs reviewed by Jonathan Hodgson PA-C  /84   Pulse 79   Temp 98.5  F (36.9  C) (Oral)   Resp 18   SpO2 94%     Differential Diagnosis:  URI Adult/Peds:  Bronchitis-viral, Influenza, Pneumonia, Sinusitis, Strep pharyngitis, Tonsilitis, Viral pharyngitis, Viral syndrome and Viral upper respiratory illness        ASSESSMENT    1. Acute bronchitis, unspecified organism    2. Throat pain    3. Pink eye disease of both eyes        PLAN    Patient is in no acute distress.    Temp is 97.7 in clinic today, lung sounds were clear, and O2 sats at 97% on RA.    RST was negative.  We will call with PCR results only if positive.  COVID testing declined today.  With length of symptoms, Rx for Zithromax today.  Rx for Polytrim for pink eye.  Rest, Push fluids, vaporizer, elevation of head of bed.  Ibuprofen and or Tylenol for any fever or body aches.  Over the counter cough suppressant- PRN- as discussed.   If symptoms worsen, recheck immediately otherwise follow up with your PCP in 1 week if symptoms are not improving.  Worrisome symptoms discussed with instructions to go to the ED.  Patient verbalized understanding and agreed with this plan.    Labs:    Results for orders placed or performed in visit on 11/29/21   Streptococcus A Rapid Screen w/Reflex to PCR - Clinic Collect     Status: Normal    Specimen: Throat; Swab   Result Value Ref Range    Group A Strep antigen Negative Negative        Vital signs reviewed by Jonathan STEARNS  REESE Hodgson  /84   Pulse 79   Temp 98.5  F (36.9  C) (Oral)   Resp 18   SpO2 94%     Current Meds      Current Outpatient Medications:      aspirin 81 MG EC tablet, [ASPIRIN 81 MG EC TABLET] Take 81 mg by mouth daily., Disp: , Rfl:      azithromycin (ZITHROMAX) 250 MG tablet, Take 2 tablets (500 mg) by mouth daily for 1 day, THEN 1 tablet (250 mg) daily for 4 days., Disp: 6 tablet, Rfl: 0     levothyroxine (SYNTHROID, LEVOTHROID) 50 MCG tablet, [LEVOTHYROXINE (SYNTHROID, LEVOTHROID) 50 MCG TABLET] Take 1 tablet (50 mcg total) by mouth daily., Disp: 90 tablet, Rfl: 3     simvastatin (ZOCOR) 20 MG tablet, [SIMVASTATIN (ZOCOR) 20 MG TABLET] Take 1 tablet (20 mg total) by mouth at bedtime., Disp: 90 tablet, Rfl: 3     trimethoprim-polymyxin b (POLYTRIM) 43294-0.1 UNIT/ML-% ophthalmic solution, Place 2 drops into both eyes every 4 hours for 7 days, Disp: 5 mL, Rfl: 0      Respiratory History    occasional episodes of bronchitis      SUBJECTIVE    HPI: Blas Garza is an 71 year old male who presents with chest congestion, cough occasional, productive mucoid and nasal congestion.  Symptoms began 1.5  weeks ago and has unchanged.  There is no shortness of breath, wheezing and chest pain.  Patient is eating and drinking well.  No fever, nausea, vomiting, or diarrhea.    He has also had some red eyes with mattering for the past week.  No eye pain or visual disturbances.    Patient denies any recent travel or exposure to known COVID positive tested individual.      ROS:     Review of Systems   Constitutional: Negative.  Negative for chills and fever.   HENT: Positive for congestion and sore throat. Negative for ear pain, rhinorrhea, sinus pressure and sinus pain.    Eyes: Positive for discharge and redness. Negative for photophobia, pain, itching and visual disturbance.   Respiratory: Positive for cough. Negative for chest tightness, shortness of breath and wheezing.    Cardiovascular: Negative.  Negative for  chest pain and palpitations.   Gastrointestinal: Negative.  Negative for abdominal pain, diarrhea, nausea and vomiting.   Genitourinary: Negative for dysuria, frequency, hematuria and urgency.   Musculoskeletal: Negative.  Negative for myalgias.   Skin: Negative for rash.   Allergic/Immunologic: Negative.  Negative for immunocompromised state.   Neurological: Negative.  Negative for headaches.         Family History   Family History   Problem Relation Age of Onset     Heart Disease Father          age 62 of MI     Dementia Mother      No Known Problems Sister      No Known Problems Sister      No Known Problems Sister      No Known Problems Sister      No Known Problems Daughter      No Known Problems Daughter         Problem history  Patient Active Problem List   Diagnosis     Pituitary macroadenoma (H)     Secondary hypothyroidism     HLD (hyperlipidemia)     Nonsmoker        Allergies  No Known Allergies     Social History  Social History     Socioeconomic History     Marital status:      Spouse name: Not on file     Number of children: Not on file     Years of education: Not on file     Highest education level: Not on file   Occupational History     Not on file   Tobacco Use     Smoking status: Never Smoker     Smokeless tobacco: Never Used   Substance and Sexual Activity     Alcohol use: Not on file     Drug use: Not on file     Sexual activity: Not on file   Other Topics Concern     Not on file   Social History Narrative    . Lives in [place]. 2 daughters. Works as a      Social Determinants of Health     Financial Resource Strain: Not on file   Food Insecurity: Not on file   Transportation Needs: Not on file   Physical Activity: Not on file   Stress: Not on file   Social Connections: Not on file   Intimate Partner Violence: Not on file   Housing Stability: Not on file        OBJECTIVE     Vital signs reviewed by Jonathan Hodgson PA-C  /84   Pulse 79    Temp 98.5  F (36.9  C) (Oral)   Resp 18   SpO2 94%      Physical Exam  Vitals reviewed.   Constitutional:       General: He is not in acute distress.     Appearance: He is well-developed. He is not ill-appearing, toxic-appearing or diaphoretic.   HENT:      Head: Normocephalic and atraumatic.      Right Ear: Hearing, tympanic membrane, ear canal and external ear normal. No drainage, swelling or tenderness. Tympanic membrane is not perforated, erythematous, retracted or bulging.      Left Ear: Hearing, tympanic membrane, ear canal and external ear normal. No drainage, swelling or tenderness. Tympanic membrane is not perforated, erythematous, retracted or bulging.      Nose: Congestion and rhinorrhea present. No nasal tenderness or mucosal edema.      Right Turbinates: Not enlarged or swollen.      Left Turbinates: Not enlarged or swollen.      Right Sinus: No maxillary sinus tenderness or frontal sinus tenderness.      Left Sinus: No maxillary sinus tenderness or frontal sinus tenderness.      Mouth/Throat:      Pharynx: Posterior oropharyngeal erythema present. No pharyngeal swelling, oropharyngeal exudate or uvula swelling.      Tonsils: No tonsillar exudate. 0 on the right. 0 on the left.   Eyes:      General: Lids are normal.         Right eye: No foreign body, discharge or hordeolum.         Left eye: No foreign body, discharge or hordeolum.      Conjunctiva/sclera:      Right eye: Right conjunctiva is injected. No exudate.     Left eye: Left conjunctiva is injected. No exudate.     Pupils: Pupils are equal, round, and reactive to light.   Cardiovascular:      Rate and Rhythm: Normal rate and regular rhythm.      Heart sounds: Normal heart sounds. No murmur heard.  No friction rub. No gallop.    Pulmonary:      Effort: Pulmonary effort is normal. No accessory muscle usage, respiratory distress or retractions.      Breath sounds: Normal breath sounds and air entry. No stridor, decreased air movement or  transmitted upper airway sounds. No decreased breath sounds, wheezing, rhonchi or rales.   Chest:      Chest wall: No tenderness.   Abdominal:      General: Bowel sounds are normal. There is no distension.      Palpations: Abdomen is soft. Abdomen is not rigid. There is no mass.      Tenderness: There is no abdominal tenderness. There is no guarding or rebound.   Musculoskeletal:         General: Normal range of motion.      Cervical back: Normal range of motion and neck supple.   Lymphadenopathy:      Head:      Right side of head: No submental, submandibular, tonsillar, preauricular or posterior auricular adenopathy.      Left side of head: No submental, submandibular, tonsillar, preauricular or posterior auricular adenopathy.      Cervical:      Right cervical: No superficial or posterior cervical adenopathy.     Left cervical: No superficial or posterior cervical adenopathy.   Skin:     General: Skin is warm.      Capillary Refill: Capillary refill takes less than 2 seconds.   Neurological:      Mental Status: He is alert and oriented to person, place, and time.      Cranial Nerves: No cranial nerve deficit.      Sensory: No sensory deficit.      Motor: No abnormal muscle tone.      Coordination: Coordination normal.      Deep Tendon Reflexes: Reflexes normal.   Psychiatric:         Behavior: Behavior normal. Behavior is cooperative.         Thought Content: Thought content normal.         Judgment: Judgment normal.           Jonathan Hodgson PA-C  11/29/2021, 10:18 AM

## 2022-06-21 DIAGNOSIS — E03.9 HYPOTHYROIDISM: ICD-10-CM

## 2022-06-21 RX ORDER — LEVOTHYROXINE SODIUM 50 UG/1
50 TABLET ORAL DAILY
Qty: 90 TABLET | Refills: 0 | Status: SHIPPED | OUTPATIENT
Start: 2022-06-21 | End: 2022-09-18

## 2022-06-21 NOTE — TELEPHONE ENCOUNTER
Reason for Call:  Medication refill:    Do you use a [facility]? [place] of the pharmacy and phone number for the current request:  [facility]  Kevin Sanchez  Name of the medication requested:   Levothyroxine 50 mcg    Last office visit with PCP : 08/06/2021    Can we leave a detailed message on this number? YES    Phone number patient can be reached at: Cell number on file:    Telephone Information:   Mobile [phone]       Best Time: Any    Call taken on 6/21/2022 at 9:12 AM by Edilia Andino

## 2022-09-12 DIAGNOSIS — E78.5 HYPERLIPIDEMIA: ICD-10-CM

## 2022-09-12 RX ORDER — SIMVASTATIN 20 MG
20 TABLET ORAL AT BEDTIME
Qty: 90 TABLET | Refills: 0 | Status: SHIPPED | OUTPATIENT
Start: 2022-09-12 | End: 2022-11-07

## 2022-09-18 DIAGNOSIS — E03.9 HYPOTHYROIDISM: ICD-10-CM

## 2022-09-18 RX ORDER — LEVOTHYROXINE SODIUM 50 UG/1
TABLET ORAL
Qty: 90 TABLET | Refills: 0 | Status: SHIPPED | OUTPATIENT
Start: 2022-09-18 | End: 2022-11-07

## 2022-11-07 ENCOUNTER — OFFICE VISIT (OUTPATIENT)
Dept: INTERNAL MEDICINE | Facility: CLINIC | Age: 72
End: 2022-11-07
Payer: COMMERCIAL

## 2022-11-07 VITALS
HEIGHT: 72 IN | RESPIRATION RATE: 18 BRPM | WEIGHT: 240.9 LBS | BODY MASS INDEX: 32.63 KG/M2 | HEART RATE: 69 BPM | OXYGEN SATURATION: 97 % | DIASTOLIC BLOOD PRESSURE: 92 MMHG | SYSTOLIC BLOOD PRESSURE: 140 MMHG

## 2022-11-07 DIAGNOSIS — Z12.5 SCREENING FOR PROSTATE CANCER: ICD-10-CM

## 2022-11-07 DIAGNOSIS — D35.2 PITUITARY MACROADENOMA (H): ICD-10-CM

## 2022-11-07 DIAGNOSIS — Z00.00 MEDICARE ANNUAL WELLNESS VISIT, SUBSEQUENT: Primary | ICD-10-CM

## 2022-11-07 DIAGNOSIS — E78.2 MIXED HYPERLIPIDEMIA: ICD-10-CM

## 2022-11-07 DIAGNOSIS — E03.8 SECONDARY HYPOTHYROIDISM: ICD-10-CM

## 2022-11-07 LAB
ALBUMIN SERPL BCG-MCNC: 3.9 G/DL (ref 3.5–5.2)
ALP SERPL-CCNC: 92 U/L (ref 40–129)
ALT SERPL W P-5'-P-CCNC: 29 U/L (ref 10–50)
ANION GAP SERPL CALCULATED.3IONS-SCNC: 13 MMOL/L (ref 7–15)
AST SERPL W P-5'-P-CCNC: 30 U/L (ref 10–50)
BILIRUB SERPL-MCNC: 0.9 MG/DL
BUN SERPL-MCNC: 18.8 MG/DL (ref 8–23)
CALCIUM SERPL-MCNC: 8.7 MG/DL (ref 8.8–10.2)
CHLORIDE SERPL-SCNC: 103 MMOL/L (ref 98–107)
CHOLEST SERPL-MCNC: 189 MG/DL
CREAT SERPL-MCNC: 1.04 MG/DL (ref 0.67–1.17)
DEPRECATED HCO3 PLAS-SCNC: 25 MMOL/L (ref 22–29)
GFR SERPL CREATININE-BSD FRML MDRD: 76 ML/MIN/1.73M2
GLUCOSE SERPL-MCNC: 86 MG/DL (ref 70–99)
HDLC SERPL-MCNC: 39 MG/DL
LDLC SERPL CALC-MCNC: 131 MG/DL
NONHDLC SERPL-MCNC: 150 MG/DL
POTASSIUM SERPL-SCNC: 4 MMOL/L (ref 3.4–5.3)
PROT SERPL-MCNC: 7.5 G/DL (ref 6.4–8.3)
PSA SERPL-MCNC: 9.29 NG/ML (ref 0–6.5)
SODIUM SERPL-SCNC: 141 MMOL/L (ref 136–145)
T3FREE SERPL-MCNC: 2.5 PG/ML (ref 2–4.4)
T4 FREE SERPL-MCNC: 0.94 NG/DL (ref 0.9–1.7)
TRIGL SERPL-MCNC: 94 MG/DL
TSH SERPL DL<=0.005 MIU/L-ACNC: 2.32 UIU/ML (ref 0.3–4.2)

## 2022-11-07 PROCEDURE — 99397 PER PM REEVAL EST PAT 65+ YR: CPT | Performed by: INTERNAL MEDICINE

## 2022-11-07 PROCEDURE — 80061 LIPID PANEL: CPT | Performed by: INTERNAL MEDICINE

## 2022-11-07 PROCEDURE — 84443 ASSAY THYROID STIM HORMONE: CPT | Performed by: INTERNAL MEDICINE

## 2022-11-07 PROCEDURE — 84439 ASSAY OF FREE THYROXINE: CPT | Performed by: INTERNAL MEDICINE

## 2022-11-07 PROCEDURE — 80053 COMPREHEN METABOLIC PANEL: CPT | Performed by: INTERNAL MEDICINE

## 2022-11-07 PROCEDURE — 99214 OFFICE O/P EST MOD 30 MIN: CPT | Mod: 25 | Performed by: INTERNAL MEDICINE

## 2022-11-07 PROCEDURE — 84481 FREE ASSAY (FT-3): CPT | Performed by: INTERNAL MEDICINE

## 2022-11-07 PROCEDURE — 36415 COLL VENOUS BLD VENIPUNCTURE: CPT | Performed by: INTERNAL MEDICINE

## 2022-11-07 PROCEDURE — G0103 PSA SCREENING: HCPCS | Performed by: INTERNAL MEDICINE

## 2022-11-07 RX ORDER — LEVOTHYROXINE SODIUM 50 UG/1
50 TABLET ORAL DAILY
Qty: 90 TABLET | Refills: 3 | Status: SHIPPED | OUTPATIENT
Start: 2022-11-07 | End: 2024-01-22

## 2022-11-07 RX ORDER — SIMVASTATIN 20 MG
20 TABLET ORAL AT BEDTIME
Qty: 90 TABLET | Refills: 3 | Status: SHIPPED | OUTPATIENT
Start: 2022-11-07 | End: 2022-11-14

## 2022-11-07 RX ORDER — LEVOTHYROXINE SODIUM 50 UG/1
50 TABLET ORAL DAILY
Qty: 90 TABLET | Refills: 3 | Status: SHIPPED | OUTPATIENT
Start: 2022-11-07 | End: 2022-11-07

## 2022-11-07 RX ORDER — SIMVASTATIN 20 MG
20 TABLET ORAL AT BEDTIME
Qty: 90 TABLET | Refills: 3 | Status: SHIPPED | OUTPATIENT
Start: 2022-11-07 | End: 2022-11-07

## 2022-11-07 ASSESSMENT — ENCOUNTER SYMPTOMS
CONSTIPATION: 0
HEADACHES: 0
ABDOMINAL PAIN: 0
HEMATOCHEZIA: 0
DYSURIA: 0
HEMATURIA: 0
WEAKNESS: 0
COUGH: 0
JOINT SWELLING: 0
FREQUENCY: 0
FEVER: 0
CHILLS: 0
ARTHRALGIAS: 0
DIZZINESS: 0
PARESTHESIAS: 0
MYALGIAS: 0
PALPITATIONS: 0
SORE THROAT: 0
HEARTBURN: 0
DIARRHEA: 0
SHORTNESS OF BREATH: 0
EYE PAIN: 0
NAUSEA: 0
NERVOUS/ANXIOUS: 0

## 2022-11-07 ASSESSMENT — ACTIVITIES OF DAILY LIVING (ADL): CURRENT_FUNCTION: NO ASSISTANCE NEEDED

## 2022-11-07 ASSESSMENT — PAIN SCALES - GENERAL: PAINLEVEL: NO PAIN (0)

## 2022-11-07 NOTE — PATIENT INSTRUCTIONS
Preventive Health Recommendations    See your health care provider every year (or as recommended) to:    Review health changes.     Discuss preventive care.      Review your medicines and supplements.    Consider colon cancer screening between the ages of 50 and 75 years old if necessary.      Cholesterol and diabetes testing as necessary.    Prostate specific antigen (PSA) testing until the age of 70 if desired.  Please be aware there are risks associated with this test.    Shots:    COVID vaccination if you have not had it yet.    Get a flu shot each year.    Get a tetanus shot every 10 years if you are under the age of 80 years old.    Talk to your doctor about a one time pneumonia vaccine that is recommended at the age of 65 years old.    Talk to your pharmacist about the shingles vaccine.  This is often better covered in the pharmacy through your insurance than if you have it in the clinic.    Lifestyle    Exercise at least 150 minutes a week (30 minutes a day, 5 days a week) if you are able. This will help you control your weight and prevent disease.    Limit alcohol to one drink per day.    No smoking.     Wear sunscreen to prevent skin cancer.     See your dentist twice a year for an exam and cleaning.    See your eye doctor every 1 to 2 years to screen for conditions such as glaucoma, macular degeneration and cataracts.    Personalized Prevention Plan  You are due for the preventive services outlined below.  Your care team is available to assist you in scheduling these services.  If you have already completed any of these items, please share that information with your care team to update in your medical record.    Health Maintenance   Topic Date Due     ANNUAL REVIEW OF HM ORDERS  Never done     ZOSTER IMMUNIZATION (1 of 2) Never done     Pneumococcal Vaccine: 65+ Years (2 - PPSV23 if available, else PCV20) 06/23/2016     DTAP/TDAP/TD IMMUNIZATION (2 - Td or Tdap) 09/23/2019     INFLUENZA VACCINE (1)  11/08/2022 (Originally 9/1/2022)     COVID-19 Vaccine (3 - Booster) 11/08/2022 (Originally 5/10/2021)     MEDICARE ANNUAL WELLNESS VISIT  11/07/2023     FALL RISK ASSESSMENT  11/07/2023     LIPID  01/13/2026     ADVANCE CARE PLANNING  11/07/2027     COLORECTAL CANCER SCREENING  06/11/2029     PHQ-2 (once per calendar year)  Completed     HEPATITIS C SCREENING  Addressed     IPV IMMUNIZATION  Aged Out     MENINGITIS IMMUNIZATION  Aged Out     HEPATITIS B IMMUNIZATION  Discontinued     AORTIC ANEURYSM SCREENING (SYSTEM ASSIGNED)  Discontinued       Personalized Prevention Plan  You are due for the preventive services outlined below.  Your care team is available to assist you in scheduling these services.  If you have already completed any of these items, please share that information with your care team to update in your medical record.  Health Maintenance Due   Topic Date Due     ANNUAL REVIEW OF HM ORDERS  Never done     Zoster (Shingles) Vaccine (1 of 2) Never done     Pneumococcal Vaccine (2 - PPSV23 if available, else PCV20) 06/23/2016     Diptheria Tetanus Pertussis (DTAP/TDAP/TD) Vaccine (2 - Td or Tdap) 09/23/2019

## 2022-11-07 NOTE — PROGRESS NOTES
"  Are you in the first 12 months of your Medicare coverage?  No    Healthy Habits:     In general, how would you rate your overall health?  Excellent    Frequency of exercise:  2-3 days/week    Duration of exercise:  15-30 minutes    Do you usually eat at least 4 servings of fruit and vegetables a day, include whole grains    & fiber and avoid regularly eating high fat or \"junk\" foods?  Yes    Taking medications regularly:  Yes    Medication side effects:  None    Ability to successfully perform activities of daily living:  No assistance needed    Home Safety:  No safety concerns identified    Hearing Impairment:  No hearing concerns    In the past 6 months, have you been bothered by leaking of urine?  No    In general, how would you rate your overall mental or emotional health?  Excellent      PHQ-2 Total Score: 0    Additional concerns today:  No    Do you feel safe in your environment? Yes    Have you ever done Advance Care Planning? (For example, a Health Directive, POLST, or a discussion with a medical provider or your loved ones about your wishes): Yes, advance care planning is on file.       Fall risk  Fallen 2 or more times in the past year?: No  Any fall with injury in the past year?: No    Cognitive Screening   1) Repeat 3 items (Leader, Season, Table)    2) Clock draw: NORMAL  3) 3 item recall: Recalls 3 objects  Results: 3 items recalled: COGNITIVE IMPAIRMENT LESS LIKELY    Mini-CogTM Copyright MA[employer]. Licensed by the author for use in [facility] [employer]; reprinted with permission (brown@.[email or web address]). All rights reserved.      "
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
## 2022-11-08 ENCOUNTER — TELEPHONE (OUTPATIENT)
Dept: INTERNAL MEDICINE | Facility: CLINIC | Age: 72
End: 2022-11-08

## 2022-11-08 NOTE — TELEPHONE ENCOUNTER
Please call patient -    ______________________________________________________________________     Home phone:  [phone] (home)     Cell phone:   Telephone Information:   Mobile [phone]       Other contacts:  Name Home Phone Work Phone Mobile Phone Relationship Lgalize CONRAD,ANTONIA [phone]   Spouse      ______________________________________________________________________     Your kidney tests are normal.  Your electrolytes are also normal.  There is no signs of diabetes.  Your liver tests are normal.      Your thyroid tests are excellent.     Cholesterol is higher.  Could increase the simvastatin (Zocor) to 40 mg as a next step.  I think it would be a good idea at this time.  Work on diet as well - - weight loss.    Prostate specific antigen (PSA) is up as well which is concerning.  I recommend a MRI scan of the prostate as a next step to evaluate whether there are signs of prostate cancer in the gland.    Another option would be to see urology in consultation and get their ideas.  They would likely do a biopsy of the prostate.    Let me know what he decides.    Asael Ricks MD  [facility]  11/8/2022, 7:39 AM     ______________________________________________________________________    Recent Results (from the past 240 hour(s))   Prostate Specific Antigen Screen    Collection Time: 11/07/22  2:24 PM   Result Value Ref Range    Prostate Specific Antigen Screen 9.29 (H) 0.00 - 6.50 ng/mL   Comprehensive metabolic panel    Collection Time: 11/07/22  2:24 PM   Result Value Ref Range    Sodium 141 136 - 145 mmol/L    Potassium 4.0 3.4 - 5.3 mmol/L    Chloride 103 98 - 107 mmol/L    Carbon Dioxide (CO2) 25 22 - 29 mmol/L    Anion Gap 13 7 - 15 mmol/L    Urea Nitrogen 18.8 8.0 - 23.0 mg/dL    Creatinine 1.04 0.67 - 1.17 mg/dL    Calcium 8.7 (L) 8.8 - 10.2 mg/dL    Glucose 86 70 - 99 mg/dL    Alkaline Phosphatase 92 40 - 129 U/L    AST 30 10 - 50 U/L    ALT 29 10 - 50 U/L     Protein Total 7.5 6.4 - 8.3 g/dL    Albumin 3.9 3.5 - 5.2 g/dL    Bilirubin Total 0.9 <=1.2 mg/dL    GFR Estimate 76 >60 mL/min/1.73m2   TSH    Collection Time: 11/07/22  2:24 PM   Result Value Ref Range    TSH 2.32 0.30 - 4.20 uIU/mL   Lipid panel reflex to direct LDL Non-fasting    Collection Time: 11/07/22  2:24 PM   Result Value Ref Range    Cholesterol 189 <200 mg/dL    Triglycerides 94 <150 mg/dL    Direct Measure HDL 39 (L) >=40 mg/dL    LDL Cholesterol Calculated 131 (H) <=100 mg/dL    Non HDL Cholesterol 150 (H) <130 mg/dL   T4, free    Collection Time: 11/07/22  2:24 PM   Result Value Ref Range    Free T4 0.94 0.90 - 1.70 ng/dL   T3, Free    Collection Time: 11/07/22  2:24 PM   Result Value Ref Range    T3 Free 2.5 2.0 - 4.4 pg/mL      ______________________________________________________________________

## 2022-11-08 NOTE — TELEPHONE ENCOUNTER
Patient called back     Writer relayed message    Patient took the medication advice and will up his simvastatin     Writer relayed the other information about orders being sent in.  -Patient declined to answer if he wanted those orders to be sent in, patient would like to have an appt with the provider first.    Writer let patient know that the first available is 1/2/2023, patient scheduled and is requesting the provider reach out if they can fit him in earlier and if someone cancels to call to add him.    Call Back   [phone]

## 2022-11-08 NOTE — TELEPHONE ENCOUNTER
Could offer a 2:15 pm on Monday as a FACE TO FACE visit or virtual visit.    Asael Ricks MD  General Internal Medicine  [facility]  11/8/2022, 1:05 PM

## 2022-11-14 ENCOUNTER — TRANSFERRED RECORDS (OUTPATIENT)
Dept: HEALTH INFORMATION MANAGEMENT | Facility: CLINIC | Age: 72
End: 2022-11-14

## 2022-11-14 ENCOUNTER — OFFICE VISIT (OUTPATIENT)
Dept: INTERNAL MEDICINE | Facility: CLINIC | Age: 72
End: 2022-11-14
Payer: COMMERCIAL

## 2022-11-14 VITALS
DIASTOLIC BLOOD PRESSURE: 66 MMHG | SYSTOLIC BLOOD PRESSURE: 132 MMHG | HEIGHT: 73 IN | WEIGHT: 235.8 LBS | BODY MASS INDEX: 31.25 KG/M2

## 2022-11-14 DIAGNOSIS — E78.2 MIXED HYPERLIPIDEMIA: ICD-10-CM

## 2022-11-14 DIAGNOSIS — R97.20 ELEVATED PROSTATE SPECIFIC ANTIGEN (PSA): Primary | ICD-10-CM

## 2022-11-14 DIAGNOSIS — F40.240 CLAUSTROPHOBIA: ICD-10-CM

## 2022-11-14 PROCEDURE — 99214 OFFICE O/P EST MOD 30 MIN: CPT | Performed by: INTERNAL MEDICINE

## 2022-11-14 RX ORDER — SIMVASTATIN 20 MG
20 TABLET ORAL AT BEDTIME
Qty: 90 TABLET | Refills: 3 | Status: SHIPPED | OUTPATIENT
Start: 2022-11-14 | End: 2022-12-26

## 2022-11-14 RX ORDER — LORAZEPAM 0.5 MG/1
0.5 TABLET ORAL
Qty: 1 TABLET | Refills: 0 | Status: SHIPPED | OUTPATIENT
Start: 2022-11-14 | End: 2024-01-25

## 2022-11-14 NOTE — PROGRESS NOTES
[facility] - Primary Care Specialists    Comprehensive and complex medical care - Chronic disease management - Shared decision making - Care coordination - Compassionate care    Patient advocacy - Rational deprescribing - Minimally disruptive medicine - Ethical focus - Customized care         Date of Service: 11/14/2022  Primary Provider: Asael Ricks    Patient Care Team:  Asael Ricks MD as PCP - General (Internal Medicine)  Asael Ricks MD as Assigned PCP  Clinic, MD Ronal as Renetta Graham as MD (Endocrinology, Diabetes, and Metabolism)  Gregory Acosta MD (Neurological Surgery)          Patient's Pharmacy:    [id number] [[facility]][address] [address]  Phone: [phone] Fax: [phone]     Patient's Contacts:  Name Home Phone Work Phone Mobile Phone Relationship LgANTONIA Kumar [phone]   Spouse      Patient's Insurance:    Payor: MEDICA / Plan: MEDICA ADVANTAGE SOLUTIONS / Product Type: HMO /           Current Outpatient Medications   Medication Instructions     levothyroxine (SYNTHROID/LEVOTHROID) 50 mcg, Oral, DAILY     LORazepam (ATIVAN) 0.5 mg, Oral, ONCE PRN     simvastatin (ZOCOR) 20 mg, Oral, AT BEDTIME        Subjective:      Blas Garza is a 72 year old male who comes in today for:    Chief Complaint   Patient presents with     Follow Up      Patient comes in today to review in detail his elevated PSA.    We reviewed the trend of his PSAs over time.  He had pretty good PSA and then it spiked up to 4.9 in the mid 2010s.  It then was 6.2 a couple years ago and now is 9.2.  This is the first time I have measured this.  He has no symptoms at all overall.  He might have slower urination or nocturia once or twice at night but no other symptoms.  No blood in his urine.    We went through the risk calculator for prostate cancer.  Based on the data we have, his risk of  "high-grade prostate cancer is roughly 16%.  There is a approximately 58 to 60% risk he does not have cancer.  We talked about studies related to number needed to treat to possibly benefit the patient long-term.  Given the trajectory, it would be good to possibly risk stratify him further.  We offered urology referral as well.  Patient is not ready to proceed with biopsy at this time.  He was also considering watchful waiting with follow-up blood work.    We spent a good time reviewing studies related to this.  He would like to go ahead and do an MRI of the prostate as a next step.  He would consider further testing if this is positive but would be okay with following if negative.    He has history of claustrophobia and has done open sided MRIs.  His last 1 was done at [facility] in [place] and had he had a poor experience there.  He would like to go through [facility] for this.    We reviewed his recent cholesterol and we are going to continue his cholesterol medication as is.    Objective:     Wt Readings from Last 3 Encounters:   11/14/22 107 kg (235 lb 12.8 oz)   11/07/22 109.3 kg (240 lb 14.4 oz)   08/06/21 104.6 kg (230 lb 9.6 oz)     BP Readings from Last 3 Encounters:   11/14/22 132/66   11/07/22 (!) 140/92   11/29/21 138/84      /66 (BP Location: Right arm, Patient Position: Sitting, Cuff Size: Adult Large)   Ht 1.854 m (6' 1\")   Wt 107 kg (235 lb 12.8 oz)   BMI 31.11 kg/m     In general, the patient is comfortable, no apparent distress.  Mood good.  Insight good.  Eyes are nonicteric.    Diagnostics:     Prostate Specific Antigen Screen   Date Value Ref Range Status   11/07/2022 9.29 (H) 0.00 - 6.50 ng/mL Final   01/13/2021 6.2 0.0 - 6.5 ng/mL Final       Assessment:     1. Elevated prostate specific antigen (PSA)    2. Claustrophobia    3. Mixed hyperlipidemia      Plan:     1. Patient will proceed with MRI of the prostate.  This will be an open sided MRI.  2. He was given a lorazepam for " sedation as well.  3. I refilled his cholesterol medication.  4. Further advice to be given once this comes back.  I will try to call the patient personally related to this.    No orders of the defined types were placed in this encounter.      Asael Ricks MD  General Internal Medicine  [facility]    Return in about 1 year (around 11/14/2023), or if symptoms worsen or fail to improve, for annual wellness visit.     No future appointments.      30 minutes or greater was spent today on the patient's care on the day of service.      This includes time for chart preparation, reviewing medical tests done before or during the visit, talking with the patient, review of quality indicators, required documentation, and other elements of care.

## 2022-11-15 NOTE — PATIENT INSTRUCTIONS
Please follow up if you have any further issues.    You may contact me by phone or MyChart if you are worsening or if things are not improving.    ______________________________________________________________________     You can call [phone] during weekday hours to get a hold of our team coordinators if you need to get a message to me.    There are 3 people in our building taking phone calls for me.  Be sure to listen to the prompts to get a hold of them.    You first press 1 for English (press another number for a different language) and then press 2 to get the .    They can then take your message and forward it onto me.    ______________________________________________________________________     Please remember that you can call [phone] ANYTIME to schedule an appointment.     You can schedule appointments 24 hours a day, 7 days a week.      Sometimes the best time to schedule an appointment is after clinic hours when less people are calling in.      Weekends are another option for calling in to schedule appointments.

## 2022-12-12 ENCOUNTER — TRANSFERRED RECORDS (OUTPATIENT)
Dept: HEALTH INFORMATION MANAGEMENT | Facility: CLINIC | Age: 72
End: 2022-12-12

## 2022-12-22 ENCOUNTER — TELEPHONE (OUTPATIENT)
Dept: INTERNAL MEDICINE | Facility: CLINIC | Age: 72
End: 2022-12-22

## 2022-12-22 NOTE — TELEPHONE ENCOUNTER
Please call patient -    ______________________________________________________________________     Home phone:  [phone] (home)     Cell phone:   Telephone Information:   Mobile [phone]       Other contacts:  Name Home Phone Work Phone Mobile Phone Relationship Lgl Jarod CONRAD,ANTONIA [phone]   Spouse      ______________________________________________________________________     Received his MRI scan results from [facility].    There is a 2.3 cm by 1.5 cm lesion on the right side of the prostate present.    Scores on testing 4/5 which means the chance of clinically significant prostate cancer is high.    Based on this finding, I would recommend consultation with urology to discuss possible biopsy and treatment.    Can use Monday reserved slot for virtual visit or FACE TO FACE visit if he would like to review this further.    Recommend seeing [facility] or [facility] urology.    Asael Ricks MD  [facility]  12/22/2022, 11:15 AM

## 2022-12-22 NOTE — TELEPHONE ENCOUNTER
"Left message with Spouse that writer calling with results and message from PCP.  Patient not available now, spouse will patient return call to clinic as \"he has been waiting for results.\"  "

## 2022-12-22 NOTE — TELEPHONE ENCOUNTER
Patient Returning Call    Reason for call:  Returning call to clinic    Information relayed to patient:  Relayed Dr. Ricks's message     Patient has additional questions:  Yes     What is a lesion?    80% Chance I have cancer? So I have cancer?    Relayed to patient Dr Ricks's message again.  Scheduled appt for 12/26 with Dr. Ricks face to face.    Patient does not wish for referrals placed at this time will discuss further with Dr. Ricks on Monday.

## 2022-12-26 ENCOUNTER — OFFICE VISIT (OUTPATIENT)
Dept: INTERNAL MEDICINE | Facility: CLINIC | Age: 72
End: 2022-12-26
Payer: COMMERCIAL

## 2022-12-26 VITALS
HEART RATE: 74 BPM | BODY MASS INDEX: 31.38 KG/M2 | RESPIRATION RATE: 20 BRPM | HEIGHT: 73 IN | SYSTOLIC BLOOD PRESSURE: 154 MMHG | DIASTOLIC BLOOD PRESSURE: 96 MMHG | OXYGEN SATURATION: 99 % | WEIGHT: 236.8 LBS

## 2022-12-26 DIAGNOSIS — R97.20 ELEVATED PROSTATE SPECIFIC ANTIGEN (PSA): Primary | ICD-10-CM

## 2022-12-26 DIAGNOSIS — E78.2 MIXED HYPERLIPIDEMIA: ICD-10-CM

## 2022-12-26 DIAGNOSIS — R93.5 ABNORMAL MRI OF ABDOMEN: ICD-10-CM

## 2022-12-26 PROCEDURE — 99214 OFFICE O/P EST MOD 30 MIN: CPT | Performed by: INTERNAL MEDICINE

## 2022-12-26 RX ORDER — SIMVASTATIN 40 MG
40 TABLET ORAL DAILY
Qty: 90 TABLET | Refills: 3 | Status: SHIPPED | OUTPATIENT
Start: 2022-12-26 | End: 2024-01-25

## 2022-12-26 NOTE — PROGRESS NOTES
[facility] - Primary Care Specialists    Comprehensive and complex medical care - Chronic disease management - Shared decision making - Care coordination - Compassionate care    Patient advocacy - Rational deprescribing - Minimally disruptive medicine - Ethical focus - Customized care         Date of Service: 12/26/2022  Primary Provider: Asael Ricks    Patient Care Team:  Asael Ricks MD as PCP - General (Internal Medicine)  Asael Ricks MD as Assigned PCP  Clinic, MD Ronal as Renetta Graham as MD (Endocrinology, Diabetes, and Metabolism)  Gregory Acosta MD (Neurological Surgery)          Patient's Pharmacy:    [id number] [[facility]][address] [address]  Phone: [phone] Fax: [phone]     Patient's Contacts:  Name Home Phone Work Phone Mobile Phone Relationship LgANTONIA Kumar [phone]   Spouse      Patient's Insurance:    Payor: MEDICA / Plan: MEDICA ADVANTAGE SOLUTIONS / Product Type: HMO /           Current Outpatient Medications   Medication Instructions     levothyroxine (SYNTHROID/LEVOTHROID) 50 mcg, Oral, DAILY     LORazepam (ATIVAN) 0.5 mg, Oral, ONCE PRN     simvastatin (ZOCOR) 20 mg, Oral, AT BEDTIME        Subjective:      Blas Garza is a 72 year old male who comes in today for:    Chief Complaint   Patient presents with     Follow Up     Medication Request     Pt states send Rx for 40 mg simvastatin      Patient comes in today mainly to discuss his abnormal MRI of the prostate.    We did the MRI of the prostate related to an elevated PSA greater than 9.    His prostate MRI showed a PI-RADS 4 lesion.  This was on the right side of the prostate.  It was approximately 2.3 cm x 1.5 cm.  His prostate was about 6 cm round.  There is no other invasion noted per report.    We reviewed recommendations related to this.  I have recommended that he see urology related to  "this and we reviewed different options related to this.  After discussion, he would like to see Dr. Quang Rehman in consultation.    We reviewed his options related to treatment depending on aggressiveness of his cancer.    We reviewed his cholesterol medication and he needs a higher dose sent into his pharmacy.    His blood pressure is elevated today but he thinks this is due to stress related to this discussion.  He generally has not had issues with his blood pressure.    Objective:     Wt Readings from Last 3 Encounters:   12/26/22 107.4 kg (236 lb 12.8 oz)   11/14/22 107 kg (235 lb 12.8 oz)   11/07/22 109.3 kg (240 lb 14.4 oz)     BP Readings from Last 3 Encounters:   12/26/22 (!) 154/96   11/14/22 132/66   11/07/22 (!) 140/92      BP (!) 154/96 (BP Location: Left arm, Patient Position: Sitting, Cuff Size: Adult Large)   Pulse 74   Resp 20   Ht 1.854 m (6' 1\")   Wt 107.4 kg (236 lb 12.8 oz)   SpO2 99%   BMI 31.24 kg/m     In general, the patient is comfortable, no apparent distress.  Mood good.  Insight good.      Diagnostics:     Component      Latest Ref Rng & Units 9/23/2009 9/28/2010 1/20/2012 6/23/2015   PSA      0.0 - 6.5 ng/mL 1.21 1.13 0.41 4.9 (H)   PSA Tumor Marker      0.00 - 6.50 ng/mL         Component      Latest Ref Rng & Units 1/13/2021 11/7/2022   PSA      0.0 - 6.5 ng/mL 6.2    PSA Tumor Marker      0.00 - 6.50 ng/mL  9.29 (H)       Assessment:     1. Elevated prostate specific antigen (PSA)    2. Abnormal MRI of prostate    3. Mixed hyperlipidemia        Plan:     1. Referral to [facility] for consideration of biopsy.  2. Original MRI is available through [facility].  3. Sent in prescription for simvastatin 40 mg a day.  4. Follow-up as needed after evaluation.  Follow-up sooner if issues.    Orders Placed This Encounter   Procedures     Adult Urology  Referral     30 minutes or greater was spent today on the patient's care on the day of service.      This includes time for " chart preparation, reviewing medical tests done before or during the visit, talking with the patient, review of quality indicators, required documentation, and other elements of care.    Asael Ricks MD  General Internal Medicine  [facility]    Return in about 10 months (around 11/8/2023), or if symptoms worsen or fail to improve, for annual wellness visit, visit and blood work.     No future appointments.

## 2022-12-26 NOTE — PATIENT INSTRUCTIONS
I have recommended that you see a urologist.  I recommend that you have a consultation with [facility].  Please call them at [phone] to schedule your appointment.    They have offices in the following locations:    [address], [address]  [place] [address]    [facility]  [address] [place] [address], [address]  [place] [address]    [place] [address], [address]  [place] [address]

## 2023-01-05 ENCOUNTER — TELEPHONE (OUTPATIENT)
Dept: INTERNAL MEDICINE | Facility: CLINIC | Age: 73
End: 2023-01-05

## 2023-01-05 NOTE — TELEPHONE ENCOUNTER
I think it is okay at this time to wait, but he could see someone else int he group if he wants to be seen sooner.    Asael Ricks MD  General Internal Medicine  [facility]  1/5/2023, 4:28 PM

## 2023-01-05 NOTE — TELEPHONE ENCOUNTER
Patient Returning Call    Reason for call:  Discuss Urology consult    Patient has additional questions:  Yes    What are your questions/concerns:  Patient calling to report that Dr Ricks referred patient to see Dr Quang Rehman at [facility].  Dr Rehman is not available for an appt until mid-March.  Patient would like to know if Dr Ricks is comfortable with him waiting until that time to be seen, or if he should pursue an appt with someone else.  Please call patient to discuss.    Okay to leave a detailed message?: Yes at Cell number on file:    Telephone Information:   Mobile [phone]

## 2023-01-06 NOTE — TELEPHONE ENCOUNTER
Spoke with patient and relayed below message. He verbalized understanding and states that if he does decide to see someone else, would Dr. Ricks know or recommend someone else that is close to that provider (Dr. Rehman)?

## 2023-01-08 NOTE — TELEPHONE ENCOUNTER
He could see Dr. Whittaker or Dr. Peña if he wishes.    Asael Ricks MD  General Internal Medicine  [facility]  1/8/2023, 9:20 AM

## 2023-01-09 NOTE — TELEPHONE ENCOUNTER
Detailed message left for patient that included recommended providers name on patients listed number [phone].

## 2023-03-22 ENCOUNTER — TRANSFERRED RECORDS (OUTPATIENT)
Dept: HEALTH INFORMATION MANAGEMENT | Facility: CLINIC | Age: 73
End: 2023-03-22

## 2023-10-09 ENCOUNTER — PATIENT OUTREACH (OUTPATIENT)
Dept: CARE COORDINATION | Facility: CLINIC | Age: 73
End: 2023-10-09
Payer: COMMERCIAL

## 2023-10-23 ENCOUNTER — PATIENT OUTREACH (OUTPATIENT)
Dept: CARE COORDINATION | Facility: CLINIC | Age: 73
End: 2023-10-23
Payer: COMMERCIAL

## 2024-01-21 DIAGNOSIS — E03.8 SECONDARY HYPOTHYROIDISM: ICD-10-CM

## 2024-01-22 RX ORDER — LEVOTHYROXINE SODIUM 50 UG/1
50 TABLET ORAL DAILY
Qty: 90 TABLET | Refills: 0 | Status: SHIPPED | OUTPATIENT
Start: 2024-01-22 | End: 2024-04-28

## 2024-01-25 ENCOUNTER — OFFICE VISIT (OUTPATIENT)
Dept: INTERNAL MEDICINE | Facility: CLINIC | Age: 74
End: 2024-01-25
Payer: COMMERCIAL

## 2024-01-25 VITALS
WEIGHT: 238.4 LBS | HEIGHT: 72 IN | BODY MASS INDEX: 32.29 KG/M2 | DIASTOLIC BLOOD PRESSURE: 80 MMHG | HEART RATE: 68 BPM | RESPIRATION RATE: 15 BRPM | SYSTOLIC BLOOD PRESSURE: 128 MMHG | TEMPERATURE: 97.5 F | OXYGEN SATURATION: 98 %

## 2024-01-25 DIAGNOSIS — H25.13 AGE-RELATED NUCLEAR CATARACT OF BOTH EYES: ICD-10-CM

## 2024-01-25 DIAGNOSIS — D35.2 PITUITARY MACROADENOMA (H): ICD-10-CM

## 2024-01-25 DIAGNOSIS — C61 MALIGNANT TUMOR OF PROSTATE (H): ICD-10-CM

## 2024-01-25 DIAGNOSIS — E03.8 SECONDARY HYPOTHYROIDISM: ICD-10-CM

## 2024-01-25 DIAGNOSIS — E78.2 MIXED HYPERLIPIDEMIA: ICD-10-CM

## 2024-01-25 DIAGNOSIS — Z01.818 PREOPERATIVE EXAMINATION: Primary | ICD-10-CM

## 2024-01-25 PROCEDURE — 99214 OFFICE O/P EST MOD 30 MIN: CPT | Performed by: INTERNAL MEDICINE

## 2024-01-25 RX ORDER — SIMVASTATIN 40 MG
40 TABLET ORAL DAILY
Qty: 90 TABLET | Refills: 3 | Status: SHIPPED | OUTPATIENT
Start: 2024-01-25
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
## 2024-01-25 NOTE — PATIENT INSTRUCTIONS
Future Appointments   Date Time Provider Department Center   6/4/2024  8:30 AM [facility]
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
## 2024-01-25 NOTE — LETTER
2024    Blas Garza   [address]  [facility] [address]         To whom it may concern:    I am sending you this letter in relationship to a patient of mine, Blas Garza ( 1950).    He has been followed as a patient here for a number of years.  He does not have a history of high blood pressure with us.    His blood pressure in clinic today is   BP Readings from Last 1 Encounters:   24 128/80      If you have any questions regarding the above, feel free to contact me at [phone].         Sincerely,      Asael Ricks MD  General Internal Medicine  [facility]

## 2024-01-29 DIAGNOSIS — E03.8 SECONDARY HYPOTHYROIDISM: ICD-10-CM

## 2024-01-29 RX ORDER — LEVOTHYROXINE SODIUM 50 UG/1
50 TABLET ORAL DAILY
Qty: 90 TABLET | Refills: 0 | OUTPATIENT
Start: 2024-01-29

## 2024-04-28 DIAGNOSIS — E03.8 SECONDARY HYPOTHYROIDISM: ICD-10-CM

## 2024-04-28 RX ORDER — LEVOTHYROXINE SODIUM 50 UG/1
50 TABLET ORAL DAILY
Qty: 90 TABLET | Refills: 3 | Status: SHIPPED | OUTPATIENT
Start: 2024-04-28

## 2024-06-04 ENCOUNTER — OFFICE VISIT (OUTPATIENT)
Dept: INTERNAL MEDICINE | Facility: CLINIC | Age: 74
End: 2024-06-04
Payer: COMMERCIAL

## 2024-06-04 VITALS
WEIGHT: 236.2 LBS | HEART RATE: 65 BPM | HEIGHT: 72 IN | RESPIRATION RATE: 19 BRPM | DIASTOLIC BLOOD PRESSURE: 86 MMHG | BODY MASS INDEX: 31.99 KG/M2 | TEMPERATURE: 98.2 F | OXYGEN SATURATION: 91 % | SYSTOLIC BLOOD PRESSURE: 146 MMHG

## 2024-06-04 DIAGNOSIS — C61 MALIGNANT TUMOR OF PROSTATE (H): ICD-10-CM

## 2024-06-04 DIAGNOSIS — L72.3 SEBACEOUS CYST: ICD-10-CM

## 2024-06-04 DIAGNOSIS — D35.2 PITUITARY MACROADENOMA (H): ICD-10-CM

## 2024-06-04 DIAGNOSIS — Z00.00 MEDICARE ANNUAL WELLNESS VISIT, SUBSEQUENT: Primary | ICD-10-CM

## 2024-06-04 DIAGNOSIS — Z82.49 FH: CAD (CORONARY ARTERY DISEASE): ICD-10-CM

## 2024-06-04 DIAGNOSIS — E03.8 SECONDARY HYPOTHYROIDISM: ICD-10-CM

## 2024-06-04 DIAGNOSIS — R53.83 OTHER FATIGUE: ICD-10-CM

## 2024-06-04 DIAGNOSIS — R06.00 DYSPNEA, UNSPECIFIED TYPE: ICD-10-CM

## 2024-06-04 DIAGNOSIS — E78.2 MIXED HYPERLIPIDEMIA: ICD-10-CM

## 2024-06-04 LAB
ALBUMIN SERPL BCG-MCNC: 4 G/DL (ref 3.5–5.2)
ALP SERPL-CCNC: 89 U/L (ref 40–150)
ALT SERPL W P-5'-P-CCNC: 24 U/L (ref 0–70)
ANION GAP SERPL CALCULATED.3IONS-SCNC: 12 MMOL/L (ref 7–15)
AST SERPL W P-5'-P-CCNC: 36 U/L (ref 0–45)
BILIRUB SERPL-MCNC: 1.1 MG/DL
BUN SERPL-MCNC: 17 MG/DL (ref 8–23)
CALCIUM SERPL-MCNC: 8.9 MG/DL (ref 8.8–10.2)
CHLORIDE SERPL-SCNC: 104 MMOL/L (ref 98–107)
CHOLEST SERPL-MCNC: 159 MG/DL
CREAT SERPL-MCNC: 0.99 MG/DL (ref 0.67–1.17)
DEPRECATED HCO3 PLAS-SCNC: 24 MMOL/L (ref 22–29)
EGFRCR SERPLBLD CKD-EPI 2021: 80 ML/MIN/1.73M2
ERYTHROCYTE [DISTWIDTH] IN BLOOD BY AUTOMATED COUNT: 13.1 % (ref 10–15)
FASTING STATUS PATIENT QL REPORTED: NO
FASTING STATUS PATIENT QL REPORTED: NO
GLUCOSE SERPL-MCNC: 91 MG/DL (ref 70–99)
HCT VFR BLD AUTO: 46.2 % (ref 40–53)
HDLC SERPL-MCNC: 34 MG/DL
HGB BLD-MCNC: 15.2 G/DL (ref 13.3–17.7)
LDLC SERPL CALC-MCNC: 109 MG/DL
MCH RBC QN AUTO: 28.6 PG (ref 26.5–33)
MCHC RBC AUTO-ENTMCNC: 32.9 G/DL (ref 31.5–36.5)
MCV RBC AUTO: 87 FL (ref 78–100)
NONHDLC SERPL-MCNC: 125 MG/DL
NT-PROBNP SERPL-MCNC: 140 PG/ML (ref 0–900)
PLATELET # BLD AUTO: 205 10E3/UL (ref 150–450)
POTASSIUM SERPL-SCNC: 4.9 MMOL/L (ref 3.4–5.3)
PROT SERPL-MCNC: 6.9 G/DL (ref 6.4–8.3)
RBC # BLD AUTO: 5.31 10E6/UL (ref 4.4–5.9)
SODIUM SERPL-SCNC: 140 MMOL/L (ref 135–145)
TRIGL SERPL-MCNC: 81 MG/DL
TSH SERPL DL<=0.005 MIU/L-ACNC: 1.39 UIU/ML (ref 0.3–4.2)
WBC # BLD AUTO: 5.3 10E3/UL (ref 4–11)

## 2024-06-04 PROCEDURE — 99214 OFFICE O/P EST MOD 30 MIN: CPT | Mod: 25 | Performed by: INTERNAL MEDICINE

## 2024-06-04 PROCEDURE — G0439 PPPS, SUBSEQ VISIT: HCPCS | Performed by: INTERNAL MEDICINE

## 2024-06-04 PROCEDURE — 80061 LIPID PANEL: CPT | Performed by: INTERNAL MEDICINE

## 2024-06-04 PROCEDURE — 85027 COMPLETE CBC AUTOMATED: CPT | Performed by: INTERNAL MEDICINE

## 2024-06-04 PROCEDURE — 80053 COMPREHEN METABOLIC PANEL: CPT | Performed by: INTERNAL MEDICINE

## 2024-06-04 PROCEDURE — 83880 ASSAY OF NATRIURETIC PEPTIDE: CPT | Performed by: INTERNAL MEDICINE

## 2024-06-04 PROCEDURE — 84443 ASSAY THYROID STIM HORMONE: CPT | Performed by: INTERNAL MEDICINE

## 2024-06-04 PROCEDURE — 36415 COLL VENOUS BLD VENIPUNCTURE: CPT | Performed by: INTERNAL MEDICINE

## 2024-06-04 SDOH — HEALTH STABILITY: PHYSICAL HEALTH: ON AVERAGE, HOW MANY DAYS PER WEEK DO YOU ENGAGE IN MODERATE TO STRENUOUS EXERCISE (LIKE A BRISK WALK)?: 2 DAYS

## 2024-06-04 ASSESSMENT — SOCIAL DETERMINANTS OF HEALTH (SDOH): HOW OFTEN DO YOU GET TOGETHER WITH FRIENDS OR RELATIVES?: PATIENT DECLINED
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
## 2024-06-04 NOTE — LETTER
6/4/2024    Blas Garza   [address]  [facility] [address]         Dear Blas,    It was good to see you in clinic.  I hope your questions were answered at the time of your visit.    The results of your tests from your visit were as follows:    Resulted Orders   TSH with free T4 reflex   Result Value Ref Range    TSH 1.39 0.30 - 4.20 uIU/mL   Comprehensive metabolic panel   Result Value Ref Range    Sodium 140 135 - 145 mmol/L    Potassium 4.9 3.4 - 5.3 mmol/L    Carbon Dioxide (CO2) 24 22 - 29 mmol/L    Anion Gap 12 7 - 15 mmol/L    Urea Nitrogen 17.0 8.0 - 23.0 mg/dL    Creatinine 0.99 0.67 - 1.17 mg/dL    GFR Estimate 80 >60 mL/min/1.73m2    Calcium 8.9 8.8 - 10.2 mg/dL    Chloride 104 98 - 107 mmol/L    Glucose 91 70 - 99 mg/dL    Alkaline Phosphatase 89 40 - 150 U/L    AST 36 0 - 45 U/L    ALT 24 0 - 70 U/L    Protein Total 6.9 6.4 - 8.3 g/dL    Albumin 4.0 3.5 - 5.2 g/dL    Bilirubin Total 1.1 <=1.2 mg/dL    Patient Fasting > 8hrs? No    Lipid panel reflex to direct LDL Fasting   Result Value Ref Range    Cholesterol 159 <200 mg/dL    Triglycerides 81 <150 mg/dL    Direct Measure HDL 34 (L) >=40 mg/dL    LDL Cholesterol Calculated 109 (H) <=100 mg/dL   CBC with platelets   Result Value Ref Range    WBC Count 5.3 4.0 - 11.0 10e3/uL    RBC Count 5.31 4.40 - 5.90 10e6/uL    Hemoglobin 15.2 13.3 - 17.7 g/dL    Hematocrit 46.2 40.0 - 53.0 %    MCV 87 78 - 100 fL    MCH 28.6 26.5 - 33.0 pg    MCHC 32.9 31.5 - 36.5 g/dL    RDW 13.1 10.0 - 15.0 %    Platelet Count 205 150 - 450 10e3/uL   N terminal pro BNP outpatient   Result Value Ref Range    N Terminal Pro BNP Outpatient 140 0 - 900 pg/mL     Your thyroid tests are excellent.     Your kidney tests are normal.  Your electrolytes are also normal.  There is no signs of diabetes.  Your liver tests are normal.      Your blood counts are normal and you are not anemic.     Cholesterol is doing okay.    No signs of heart failure on your blood work.    Please  get the chest x-ray as we advised at the clinic and get the stress test through the heart clinic.    Please follow up again in 1 year, sooner if issues.     If you have any questions regarding these results, please feel free to contact me at [phone].  I wish you the best of health!      Sincerely,      Asael Ricks MD  General Internal Medicine  [facility]

## 2024-06-04 NOTE — PROGRESS NOTES
Preventive Care Visit  [facility]  Asael Ricks MD, Internal Medicine  Jun 4, 2024  {Provider  Link to SmartSet :348858}    {PROVIDER CHARTING PREFERENCE:849251}    Devin Odonnell is a 74 year old, presenting for the following:  Annual Visit        6/4/2024     8:18 AM   Additional Questions   Roomed by      {ROOMER if patient is in their first year of Medicare a vision screen is required click here to document the Vison screen and then refresh the note to pull in results  :485923}    Health Care Directive  Patient does not have a Health Care Directive or Living Will: {ADVANCE_DIRECTIVE_STATUS:278788}    HPI  ***  {MA/LPN/RN Pre-Provider Visit Orders- hCG/UA/Strep (Optional):003367}  {SUPERLIST (Optional):519500}  {additonal problems for provider to add (Optional):722504}      6/4/2024   General Health   How would you rate your overall physical health? Excellent   Feel stress (tense, anxious, or unable to sleep) Not at all         6/4/2024   Nutrition   Diet: Regular (no restrictions)         6/4/2024   Exercise   Days per week of moderate/strenous exercise 2 days   (!) EXERCISE CONCERN      6/4/2024   Social Factors   Frequency of gathering with friends or relatives Patient declined   Worry food won't last until get money to buy more No   Food not last or not have enough money for food? No   Do you have housing?  Yes   Are you worried about losing your housing? No   Lack of transportation? No   Unable to get utilities (heat,electricity)? No         6/4/2024   Fall Risk   Fallen 2 or more times in the past year? No   Trouble with walking or balance? No          6/4/2024   Activities of Daily Living- Home Safety   Needs help with the following daily activites None of the above   Safety concerns in the home None of the above         6/4/2024   Dental   Dentist two times every year? (!) NO         6/4/2024   Hearing Screening   Hearing concerns? None of the above         6/4/2024    Driving Risk Screening   Patient/family members have concerns about driving No         6/4/2024   General Alertness/Fatigue Screening   Have you been more tired than usual lately? No         6/4/2024   Urinary Incontinence Screening   Bothered by leaking urine in past 6 months No         6/4/2024   TB Screening   Were you born outside of the [place]? Yes         Today's PHQ-2 Score:       6/4/2024     8:06 AM   PHQ-2 ( 1999 Pfizer)   Q1: Little interest or pleasure in doing things 0   Q2: Feeling down, depressed or hopeless 0   PHQ-2 Score 0   Q1: Little interest or pleasure in doing things Not at all   Q2: Feeling down, depressed or hopeless Not at all   PHQ-2 Score 0           6/4/2024   Substance Use   Alcohol more than 3/day or more than 7/wk No   Do you have a current opioid prescription? No   How severe/bad is pain from 1 to 10? 0/10 (No Pain)   Do you use any other substances recreationally? No     Social History     Tobacco Use    Smoking status: Never    Smokeless tobacco: Never     {Provider  If there are gaps in the social history shown above, please follow the link to update and then refresh the note Link to Social and Substance History :085429}      6/4/2024   AAA Screening   Family history of Abdominal Aortic Aneurysm (AAA)? No   ASCVD Risk   The 10-year ASCVD risk score (Shant FLOWERS, et al., 2019) is: 30.2%    Values used to calculate the score:      Age: 74 years      Sex: Male      Is Non- : No      Diabetic: No      Tobacco smoker: No      Systolic Blood Pressure: 146 mmHg      Is BP treated: No      HDL Cholesterol: 39 mg/dL      Total Cholesterol: 189 mg/dL    {Link to Fracture Risk Assessment Tool (Optional):719456}    {Provider  Use the storyboard to review patient history, after sections have been marked as reviewed, refresh note to capture documentation:424958}  {Provider   REQUIRED AWV use this link to review and update sexual activity history  after section  has been marked as reviewed, refresh note to capture documentation:543514}  Reviewed and updated as needed this visit by Provider                    {HISTORY OPTIONS (Optional):173876}  Current providers sharing in care for this patient include:  Patient Care Team:  Asael Ricks MD as PCP - General (Internal Medicine)  Asael Ricks MD as Assigned PCP  Clinic, MD Ronal as Renetta Graham as MD (Endocrinology, Diabetes, and Metabolism)  Gregory Acosta as MD (Neurological Surgery)    The following health maintenance items are reviewed in Epic and correct as of today:  Health Maintenance   Topic Date Due    ANNUAL REVIEW OF HM ORDERS  Never done    ZOSTER IMMUNIZATION (1 of 2) Never done    Pneumococcal Vaccine: 65+ Years (2 of 2 - PPSV23 or PCV20) 06/23/2016    DTAP/TDAP/TD IMMUNIZATION (2 - Td or Tdap) 09/23/2019    COVID-19 Vaccine (3 - 2023-24 season) 09/01/2023    MEDICARE ANNUAL WELLNESS VISIT  11/07/2023    LIPID  11/07/2023    RSV VACCINE (Pregnancy & 60+) (1 - 1-dose 60+ series) 01/25/2025 (Originally 6/1/2010)    TSH W/FREE T4 REFLEX  07/14/2024    INFLUENZA VACCINE (Season Ended) 09/01/2024    FALL RISK ASSESSMENT  06/04/2025    GLUCOSE  11/07/2025    ADVANCE CARE PLANNING  11/07/2027    COLORECTAL CANCER SCREENING  06/11/2029    PHQ-2 (once per calendar year)  Completed    HEPATITIS C SCREENING  Addressed    IPV IMMUNIZATION  Aged Out    HPV IMMUNIZATION  Aged Out    MENINGITIS IMMUNIZATION  Aged Out    RSV MONOCLONAL ANTIBODY  Aged Out       {ROS Picklists (Optional):700042}     Objective    Exam  BP (!) 146/86 (BP Location: Right arm, Patient Position: Sitting, Cuff Size: Adult Regular)   Pulse 65   Temp 98.2  F (36.8  C) (Oral)   Resp 19   Ht 1.829 m (6')   Wt 107.1 kg (236 lb 3.2 oz)   SpO2 91%   BMI 32.03 kg/m     Estimated body mass index is 32.03 kg/m  as calculated from the following:    Height as of this encounter: 1.829 m (6').    Weight as of this encounter: 107.1 kg (236 lb 3.2  oz).    Physical Exam  {Exam Choices (Optional):475422}        6/4/2024   Mini Cog   Clock Draw Score 2 Normal   3 Item Recall 3 objects recalled   Mini Cog Total Score 5     {A Mini-Cog total score of 0-2 suggests the possibility of dementia, score of 3-5 suggests no dementia:349008}         Signed Electronically by: Asael Ricks MD  {Email feedback regarding this note to [email or web address]   :421884}

## 2024-06-04 NOTE — PATIENT INSTRUCTIONS
"Please follow up if you have any further issues.    You may contact me if you are worsening or if things are not improving.    ______________________________________________________________________     How do I get a hold of Dr. Ricks or his team more directly?    One option is to leave me a [employer] message about your situation.  Another option is to call our Care Team coordinators.  These are 3 people in our [facility] taking phone calls.  They are available Monday - Friday 7 am - 6 pm.  You can call [phone] and when asked for a voice prompt, say \"CARE TEAM\" to get connected to them.  ______________________________________________________________________     What if I need an appointment with Dr. Ricks specifically?    Please remember that you can call [phone] ANYTIME to schedule an appointment.  With the voice prompt, say \"APPOINTMENTS.\"    You can schedule appointments 24 hours a day, 7 days a week.      Sometimes the best time to schedule an appointment is after clinic hours or on weekends when less people are calling in.      You can also schedule appointments through [employer].    ______________________________________________________________________     What if I need care more urgently?    If it is an emergency, do not wait for us to respond as it can sometimes take awhile.  Call 911 and go to the hospital.  A number of more urgent care options are available:  Walk-in Care at [place] or [place].  Open in [place] 10 am - 8 pm Monday-Friday.  9 am - 8 pm Saturday and Sunday.  Open in [place] 10 am - 8 pm Monday-Friday.  9 am - 5 pm Saturday and Sunday.  Earlier times tend to be less crowded.  Other local urgent care.  Urgency Room (in [place] [facility] and [place]).  For more information, [email or web address].  All locations are open from 8am to 9pm daily.  Emergency room especially in the middle of the night.  If your problem is more orthopedic in nature (joint, spine or bone pain), " "there is orthopedic urgent care as well.  [facility] urgent care.    Available locally in [facility], [place] and [place].  Open 8 am to 8 pm every day.  [facility] urgent care.  Available in [place], [place] and [place].  Open 8 am to 8 pm every day.    ______________________________________________________________________     What if I just want some advice from a nurse?    We do have triage nurses available 24-7.  Call [phone] and when asked for a voice prompt, say \"TRIAGE NURSE\"     "

## 2024-12-09 ENCOUNTER — TELEPHONE (OUTPATIENT)
Dept: INTERNAL MEDICINE | Facility: CLINIC | Age: 74
End: 2024-12-09
Payer: COMMERCIAL

## 2024-12-09 NOTE — TELEPHONE ENCOUNTER
General Call      Reason for Call:  Patient calling to state another provider wants him to do some testing but wants to go over them with the PCP     Patient just wants a call back to go over it     Could we send this information to you in [employer] or would you prefer to receive a phone call?:   Patient would prefer a phone call   Okay to leave a detailed message?: Yes at Cell number on file:    Telephone Information:   Mobile [phone]

## 2024-12-10 NOTE — TELEPHONE ENCOUNTER
Routing to PCP to see if this is appropriate for a phone visit.       Casey Rico Jr., CMA on 12/10/2024 at 3:04 PM

## 2024-12-10 NOTE — TELEPHONE ENCOUNTER
Forwarding onto RN to see if they can resolve this information or get more information.    Asael Ricks MD  General Internal Medicine  [facility]  12/10/2024, 4:08 PM

## 2024-12-11 NOTE — TELEPHONE ENCOUNTER
LMTCB.-If pt returns call please see note below from PCP and gather more information from pt.    ROSARIO Reynolds.

## 2025-01-23 DIAGNOSIS — E78.2 MIXED HYPERLIPIDEMIA: ICD-10-CM

## 2025-01-23 RX ORDER — SIMVASTATIN 40 MG
40 TABLET ORAL DAILY
Qty: 90 TABLET | Refills: 0 | Status: SHIPPED | OUTPATIENT
Start: 2025-01-23

## 2025-04-23 DIAGNOSIS — E03.8 SECONDARY HYPOTHYROIDISM: ICD-10-CM

## 2025-04-23 DIAGNOSIS — E78.2 MIXED HYPERLIPIDEMIA: ICD-10-CM

## 2025-04-23 RX ORDER — LEVOTHYROXINE SODIUM 50 UG/1
50 TABLET ORAL DAILY
Qty: 90 TABLET | Refills: 0 | Status: SHIPPED | OUTPATIENT
Start: 2025-04-23

## 2025-04-23 RX ORDER — SIMVASTATIN 40 MG
40 TABLET ORAL DAILY
Qty: 90 TABLET | Refills: 0 | Status: SHIPPED | OUTPATIENT
Start: 2025-04-23

## 2025-05-05 ENCOUNTER — PATIENT OUTREACH (OUTPATIENT)
Dept: CARE COORDINATION | Facility: CLINIC | Age: 75
End: 2025-05-05
Payer: COMMERCIAL

## 2025-05-19 ENCOUNTER — PATIENT OUTREACH (OUTPATIENT)
Dept: CARE COORDINATION | Facility: CLINIC | Age: 75
End: 2025-05-19
Payer: COMMERCIAL

## 2025-06-30 ENCOUNTER — TRANSFERRED RECORDS (OUTPATIENT)
Dept: HEALTH INFORMATION MANAGEMENT | Facility: CLINIC | Age: 75
End: 2025-06-30
Payer: COMMERCIAL

## 2025-07-11 ENCOUNTER — HOSPITAL ENCOUNTER (OUTPATIENT)
Dept: GENERAL RADIOLOGY | Facility: HOSPITAL | Age: 75
Discharge: HOME | End: 2025-07-11
Attending: INTERNAL MEDICINE | Admitting: INTERNAL MEDICINE
Payer: COMMERCIAL

## 2025-07-11 DIAGNOSIS — R06.00 DYSPNEA, UNSPECIFIED TYPE: ICD-10-CM

## 2025-07-11 PROBLEM — E78.2 MIXED HYPERLIPIDEMIA: Status: ACTIVE | Noted: 2021-05-24

## 2025-07-11 PROCEDURE — 71046 X-RAY EXAM CHEST 2 VIEWS: CPT

## 2025-07-14 ENCOUNTER — PATIENT OUTREACH (OUTPATIENT)
Dept: CARE COORDINATION | Facility: CLINIC | Age: 75
End: 2025-07-14
Payer: COMMERCIAL

## 2025-07-16 ENCOUNTER — PATIENT OUTREACH (OUTPATIENT)
Dept: CARE COORDINATION | Facility: CLINIC | Age: 75
End: 2025-07-16
Payer: COMMERCIAL

## 2025-08-03 DIAGNOSIS — E03.8 SECONDARY HYPOTHYROIDISM: ICD-10-CM

## 2025-08-04 RX ORDER — LEVOTHYROXINE SODIUM 50 UG/1
50 TABLET ORAL DAILY
Qty: 90 TABLET | Refills: 2 | Status: SHIPPED | OUTPATIENT
Start: 2025-08-04

## 2025-08-19 DIAGNOSIS — E78.2 MIXED HYPERLIPIDEMIA: ICD-10-CM

## 2025-08-19 RX ORDER — SIMVASTATIN 40 MG
40 TABLET ORAL DAILY
Qty: 90 TABLET | Refills: 2 | Status: SHIPPED | OUTPATIENT
Start: 2025-08-19